# Patient Record
Sex: FEMALE | Race: WHITE | URBAN - METROPOLITAN AREA
[De-identification: names, ages, dates, MRNs, and addresses within clinical notes are randomized per-mention and may not be internally consistent; named-entity substitution may affect disease eponyms.]

---

## 2020-10-06 NOTE — PROGRESS NOTES
HISTORY OF PRESENT ILLNESS  Valeria Collazo is a 61 y.o. female. HPI     Pt is here to establish care.   This is an established visit completed with telemedicine was completed with video assist  the patient acknowledges and agrees to this method of visitation chip    She just recently moved from [place], [place]    She has htn  BP at home 123/79 126/73 128/76 122/75 126/76 131/72 120/75 118/72   HR 70s  She is on lisinopril 40 mg, 0.5 tabs of chlorthalidone 25 mg, and toprol xl 25 mg     Wt is 186 lb per pt - stable   Her and her  go on walks often  She has started to watch portions more   Discussed not drinking calories, [employer], and counting calories  Ht is 5'8\"    Ordered labs     She notes elevated cholesterol last year  She is just working on diet and wt loss    She takes fish oil and vit d3 2000U    PMHx: htn    FMHx:dad- mild kidney disease, prostate cancer grandmother - breast cancer [de-identified])    PSHx: L ankle arthroscopy, L knee arthroscopy , bilateral cheilectomy    SHx: 4-5 drinks alcohol per week, she used to smoke 30 years ago, , 2 children, retired    PREVENTIVE:  Colonoscopy: 11/16 [address] [facility]: unofficial one years ago, not yet needed   Mammo: 4/19 negative, scheduled Dr. Beth Arias 11/24/20   Pap: scheduled [facility] Dr. Beth Arias 11/24/20 - needs referral   Tdap: ~5-8 years ago, will update in clinic   Pneumovax: not yet needed  Shingrix: will be getting 10/13/20  Flu shot: 9/20  Eye exam: 3/19 [place]  Lipids: 2019 223 per pt, ordered       Patient Active Problem List    Diagnosis Date Noted    Essential hypertension 10/09/2020    Mixed hyperlipidemia 10/09/2020    Vitamin D deficiency 10/09/2020       Past Surgical History:   Procedure Laterality Date    HX KNEE ARTHROSCOPY Left 10/2011    HX OTHER SURGICAL Left 1986    Left ankle Arthroscopy    HX OTHER SURGICAL Bilateral [id number]    Biliteral Cheilectomy on big toes      Lab Results   Component Value Date/Time    WBC 7.0 12/04/2009 11:01 AM HGB 14.6 12/04/2009 11:01 AM    HCT 42.2 12/04/2009 11:01 AM    PLATELET 714 69/88/4528 11:01 AM    MCV 91.3 12/04/2009 11:01 AM     Lab Results   Component Value Date/Time    Cholesterol, total 211 (H) 11/03/2009 10:01 AM    HDL Cholesterol 43 11/03/2009 10:01 AM    LDL, calculated 132.6 (H) 11/03/2009 10:01 AM    Triglyceride 177 (H) 11/03/2009 10:01 AM    CHOL/HDL Ratio 4.9 11/03/2009 10:01 AM     Lab Results   Component Value Date/Time    GFR est non-AA >60 11/03/2009 10:01 AM    GFR est AA >60 11/03/2009 10:01 AM    Creatinine 1.0 11/03/2009 10:01 AM    BUN 16 11/03/2009 10:01 AM    Sodium 137 11/03/2009 10:01 AM    Potassium 4.1 11/03/2009 10:01 AM    Chloride 102 11/03/2009 10:01 AM    CO2 29 11/03/2009 10:01 AM        Review of Systems   Constitutional: Negative for chills and fever. HENT: Negative for hearing loss and tinnitus. Eyes: Positive for blurred vision (floaters). Negative for double vision. Respiratory: Negative for shortness of breath and wheezing. Cardiovascular: Negative for chest pain and palpitations. Gastrointestinal: Negative for nausea and vomiting. Genitourinary: Negative for dysuria and frequency. Musculoskeletal: Negative for back pain, falls and joint pain. Skin: Negative for itching and rash. Neurological: Negative for dizziness, loss of consciousness and headaches. Psychiatric/Behavioral: Negative for depression. The patient is not nervous/anxious. Physical Exam  Constitutional:       General: She is not in acute distress. Appearance: Normal appearance. She is not ill-appearing, toxic-appearing or diaphoretic. HENT:      Head: Normocephalic and atraumatic. Eyes:      General:         Right eye: No discharge. Left eye: No discharge. Conjunctiva/sclera: Conjunctivae normal.   Pulmonary:      Effort: No respiratory distress. Neurological:      Mental Status: She is alert and oriented to person, place, and time.  Mental status is at baseline. Gait: Gait normal.   Psychiatric:         Mood and Affect: Mood normal.         Behavior: Behavior normal.         ASSESSMENT and PLAN    ICD-10-CM ICD-9-CM    1. Essential hypertension           Lisinopril 40 mg    Toprol-XL    And chlorthalidone 25 mg half tablet per day Home readings great no change to dose check labs   I10 401.9 LIPID PANEL      METABOLIC PANEL, COMPREHENSIVE      CBC W/O DIFF      HEMOGLOBIN A1C WITH EAG      TSH 3RD GENERATION      VITAMIN D, 25 HYDROXY      HEPATITIS C AB   2. Mixed hyperlipidemia  E78.2 272.2 LIPID PANEL   Diet controlled check lipids treat as needed   METABOLIC PANEL, COMPREHENSIVE      CBC W/O DIFF      HEMOGLOBIN A1C WITH EAG      TSH 3RD GENERATION      VITAMIN D, 25 HYDROXY      HEPATITIS C AB   3. Physical exam, annual  Z00.00 V70.0 REFERRAL TO GYNECOLOGY      LIPID PANEL   Colonoscopy up-to-date will get report mammogram pelvic exam are scheduled bone density not yet needed eye exam is due she will schedule labs are ordered    Flu shot is completed    We will update Tdap at her next office visit    Working on weight loss   METABOLIC PANEL, COMPREHENSIVE      CBC W/O DIFF      HEMOGLOBIN A1C WITH EAG      TSH 3RD GENERATION      VITAMIN D, 25 HYDROXY      HEPATITIS C AB   4. Encounter for hepatitis C screening test for low risk patient  Z11.59 V73.89 LIPID PANEL      METABOLIC PANEL, COMPREHENSIVE   Screen   CBC W/O DIFF      HEMOGLOBIN A1C WITH EAG      TSH 3RD GENERATION      VITAMIN D, 25 HYDROXY      HEPATITIS C AB   5. Vitamin D deficiency  E55.9 268.9 LIPID PANEL      METABOLIC PANEL, COMPREHENSIVE   Takes OTC vitamin D check level treat as needed   CBC W/O DIFF      HEMOGLOBIN A1C WITH EAG      TSH 3RD GENERATION      VITAMIN D, 25 HYDROXY      HEPATITIS C AB         Scribed by Rosanne Barbosa of [address] [place] [address] [address], as dictated by Dr. Agus Jimenes. Current diagnosis and concerns discussed with pt at length.  Pt understands risks and benefits or current treatment plan and medications, and accepts the treatment and medication with any possible risks. Pt asks appropriate questions, which were answered. Pt was instructed to call with any concerns or problems. I have reviewed the note documented by the scribe. The services provided are my own. The documentation is accurate     Gail Worthy, who was evaluated through a synchronous (real-time) audio-video encounter, and/or her healthcare decision maker, is aware that it is a billable service, with coverage as determined by her insurance carrier. She provided verbal consent to proceed: Yes, and patient identification was verified. It was conducted pursuant to the emergency declaration under the [facility], [address] [place] [address] authority and the [employer] and [employer] Act. A caregiver was present when appropriate. Ability to conduct physical exam was limited. I was at home. The patient was at home.

## 2020-10-09 ENCOUNTER — VIRTUAL VISIT (OUTPATIENT)
Dept: INTERNAL MEDICINE CLINIC | Age: 60
End: 2020-10-09
Payer: OTHER GOVERNMENT

## 2020-10-09 DIAGNOSIS — Z00.00 PHYSICAL EXAM, ANNUAL: ICD-10-CM

## 2020-10-09 DIAGNOSIS — E55.9 VITAMIN D DEFICIENCY: ICD-10-CM

## 2020-10-09 DIAGNOSIS — I10 ESSENTIAL HYPERTENSION: Primary | ICD-10-CM

## 2020-10-09 DIAGNOSIS — E78.2 MIXED HYPERLIPIDEMIA: ICD-10-CM

## 2020-10-09 DIAGNOSIS — Z11.59 ENCOUNTER FOR HEPATITIS C SCREENING TEST FOR LOW RISK PATIENT: ICD-10-CM

## 2020-10-09 PROCEDURE — 99203 OFFICE O/P NEW LOW 30 MIN: CPT | Performed by: INTERNAL MEDICINE

## 2020-10-09 RX ORDER — METOPROLOL SUCCINATE 25 MG/1
25 TABLET, EXTENDED RELEASE ORAL DAILY
COMMUNITY
End: 2020-10-09 | Stop reason: SDUPTHER

## 2020-10-09 RX ORDER — GLUCOSAM/CHONDRO/HERB 149/HYAL 750-100 MG
1 TABLET ORAL DAILY
COMMUNITY

## 2020-10-09 RX ORDER — CHLORTHALIDONE 25 MG/1
25 TABLET ORAL DAILY
COMMUNITY
End: 2020-10-09 | Stop reason: SDUPTHER

## 2020-10-09 RX ORDER — LISINOPRIL 40 MG/1
40 TABLET ORAL DAILY
Qty: 90 TAB | Refills: 1 | Status: SHIPPED | OUTPATIENT
Start: 2020-10-09 | End: 2021-04-20 | Stop reason: SDUPTHER

## 2020-10-09 RX ORDER — METOPROLOL SUCCINATE 25 MG/1
25 TABLET, EXTENDED RELEASE ORAL DAILY
Qty: 90 TAB | Refills: 1 | Status: SHIPPED | OUTPATIENT
Start: 2020-10-09 | End: 2021-04-20 | Stop reason: SDUPTHER

## 2020-10-09 RX ORDER — LISINOPRIL 40 MG/1
40 TABLET ORAL DAILY
COMMUNITY
End: 2020-10-09 | Stop reason: SDUPTHER

## 2020-10-09 RX ORDER — ACETAMINOPHEN 500 MG
2000 TABLET ORAL DAILY
COMMUNITY

## 2020-10-09 RX ORDER — CHLORTHALIDONE 25 MG/1
12.5 TABLET ORAL DAILY
Qty: 45 TAB | Refills: 1 | Status: SHIPPED | OUTPATIENT
Start: 2020-10-09 | End: 2021-04-20 | Stop reason: SDUPTHER

## 2020-10-09 NOTE — TELEPHONE ENCOUNTER
Future Appointments:  Future Appointments   Date Time Provider Terri Reis   4/6/2021 11:00 AM Lyndsey Montoya MD [facility] BS AMB        Last Appointment With Me:  10/9/2020     Requested Prescriptions     Pending Prescriptions Disp Refills    chlorthalidone (HYGROTON) 25 mg tablet 45 Tab 1     Sig: Take 0.5 Tabs by mouth daily.  lisinopriL (PRINIVIL, ZESTRIL) 40 mg tablet 90 Tab 1     Sig: Take 1 Tab by mouth daily.  metoprolol succinate (Toprol XL) 25 mg XL tablet 90 Tab 1     Sig: Take 1 Tab by mouth daily.

## 2020-11-06 LAB
25(OH)D3+25(OH)D2 SERPL-MCNC: 43.4 NG/ML (ref 30–100)
ALBUMIN SERPL-MCNC: 4.4 G/DL (ref 3.8–4.9)
ALBUMIN/GLOB SERPL: 1.4 {RATIO} (ref 1.2–2.2)
ALP SERPL-CCNC: 105 IU/L (ref 39–117)
ALT SERPL-CCNC: 15 IU/L (ref 0–32)
AST SERPL-CCNC: 17 IU/L (ref 0–40)
BILIRUB SERPL-MCNC: 0.5 MG/DL (ref 0–1.2)
BUN SERPL-MCNC: 16 MG/DL (ref 8–27)
BUN/CREAT SERPL: 17 (ref 12–28)
CALCIUM SERPL-MCNC: 9.9 MG/DL (ref 8.7–10.3)
CHLORIDE SERPL-SCNC: 100 MMOL/L (ref 96–106)
CHOLEST SERPL-MCNC: 202 MG/DL (ref 100–199)
CO2 SERPL-SCNC: 26 MMOL/L (ref 20–29)
CREAT SERPL-MCNC: 0.96 MG/DL (ref 0.57–1)
ERYTHROCYTE [DISTWIDTH] IN BLOOD BY AUTOMATED COUNT: 13.2 % (ref 11.7–15.4)
EST. AVERAGE GLUCOSE BLD GHB EST-MCNC: 108 MG/DL
GLOBULIN SER CALC-MCNC: 3.2 G/DL (ref 1.5–4.5)
GLUCOSE SERPL-MCNC: 109 MG/DL (ref 65–99)
HBA1C MFR BLD: 5.4 % (ref 4.8–5.6)
HCT VFR BLD AUTO: 42.8 % (ref 34–46.6)
HCV AB S/CO SERPL IA: <0.1 S/CO RATIO (ref 0–0.9)
HDLC SERPL-MCNC: 44 MG/DL
HGB BLD-MCNC: 14.3 G/DL (ref 11.1–15.9)
LDLC SERPL CALC-MCNC: 129 MG/DL (ref 0–99)
MCH RBC QN AUTO: 30.1 PG (ref 26.6–33)
MCHC RBC AUTO-ENTMCNC: 33.4 G/DL (ref 31.5–35.7)
MCV RBC AUTO: 90 FL (ref 79–97)
PLATELET # BLD AUTO: 283 X10E3/UL (ref 150–450)
POTASSIUM SERPL-SCNC: 4.1 MMOL/L (ref 3.5–5.2)
PROT SERPL-MCNC: 7.6 G/DL (ref 6–8.5)
RBC # BLD AUTO: 4.75 X10E6/UL (ref 3.77–5.28)
SODIUM SERPL-SCNC: 136 MMOL/L (ref 134–144)
TRIGL SERPL-MCNC: 162 MG/DL (ref 0–149)
TSH SERPL DL<=0.005 MIU/L-ACNC: 1.72 UIU/ML (ref 0.45–4.5)
VLDLC SERPL CALC-MCNC: 29 MG/DL (ref 5–40)
WBC # BLD AUTO: 4.8 X10E3/UL (ref 3.4–10.8)

## 2021-03-18 ENCOUNTER — IMMUNIZATION (OUTPATIENT)
Dept: INTERNAL MEDICINE CLINIC | Age: 61
End: 2021-03-18
Payer: OTHER GOVERNMENT

## 2021-03-18 DIAGNOSIS — Z23 ENCOUNTER FOR IMMUNIZATION: Primary | ICD-10-CM

## 2021-03-18 PROCEDURE — 91300 COVID-19, MRNA, LNP-S, PF, 30MCG/0.3ML DOSE(PFIZER): CPT | Performed by: FAMILY MEDICINE

## 2021-03-18 PROCEDURE — 0001A COVID-19, MRNA, LNP-S, PF, 30MCG/0.3ML DOSE(PFIZER): CPT | Performed by: FAMILY MEDICINE

## 2021-04-08 ENCOUNTER — IMMUNIZATION (OUTPATIENT)
Dept: INTERNAL MEDICINE CLINIC | Age: 61
End: 2021-04-08
Payer: OTHER GOVERNMENT

## 2021-04-08 DIAGNOSIS — Z23 ENCOUNTER FOR IMMUNIZATION: Primary | ICD-10-CM

## 2021-04-08 PROCEDURE — 91300 COVID-19, MRNA, LNP-S, PF, 30MCG/0.3ML DOSE(PFIZER): CPT | Performed by: FAMILY MEDICINE

## 2021-04-08 PROCEDURE — 0002A COVID-19, MRNA, LNP-S, PF, 30MCG/0.3ML DOSE(PFIZER): CPT | Performed by: FAMILY MEDICINE

## 2021-04-19 NOTE — PROGRESS NOTES
HISTORY OF PRESENT ILLNESS  Pillo Dobbins is a 61 y.o. female. HPI     Last here 10/9/20. Here for routine care.       BP today is 137/83   BP at home 126/72 145/88   She is on lisinopril 40 mg, 0.5 tabs of chlorthalidone 25 mg, and toprol xl 25 mg      Wt is 193 lbs - up 7 lbs x lov    Discussed not drinking calories, [employer], and counting calories    Reviewed labs   Ordered labs      She takes fish oil and vit d3 2000U    Reports ekg in last 5 years  Her prev doctor thought she had a heart attack & SSS  She had cardiac workup 6-7 years ago, stress echo, holter monitor - all nl per pt   Discussed bringing in these records  Will get EKG today        PREVENTIVE:  Colonoscopy:  Dr. Arcadio Shirley, [place] # [phone]   Dexa: unofficial one years ago, not yet needed   Mammo: Dr. Candida Askew 20   Pap: [address] Dr. Candida Askew 20   Tdap: 7/10/14  Pneumovax: not yet needed  Shingrix: completed both   Flu shot:   Eye exam: 3/19 [place]  Lipids:  129  A1c:  5.4  Hep C:  negative  Covid: both complete (pfizer)  EK21    Patient Active Problem List    Diagnosis Date Noted    Essential hypertension 10/09/2020    Mixed hyperlipidemia 10/09/2020    Vitamin D deficiency 10/09/2020     Current Outpatient Medications   Medication Sig Dispense Refill    cholecalciferol (VITAMIN D3) (2,000 UNITS /50 MCG) cap capsule Take 2,000 Units by mouth daily.  omega 3-DHA-EPA-fish oil (Fish Oil) 1,000 mg (120 mg-180 mg) capsule Take 1 Cap by mouth daily.  chlorthalidone (HYGROTON) 25 mg tablet Take 0.5 Tabs by mouth daily. 45 Tab 1    lisinopriL (PRINIVIL, ZESTRIL) 40 mg tablet Take 1 Tab by mouth daily. 90 Tab 1    metoprolol succinate (Toprol XL) 25 mg XL tablet Take 1 Tab by mouth daily. 90 Tab 1    docusate sodium (COLACE) 100 mg capsule Take 100 mg by mouth daily.        Past Surgical History:   Procedure Laterality Date    HX KNEE ARTHROSCOPY Left 10/2011    HX OTHER SURGICAL Left 1986    Left ankle Arthroscopy    HX OTHER SURGICAL Bilateral [id number]    Biliteral Cheilectomy on big toes      Lab Results   Component Value Date/Time    WBC 4.8 11/05/2020 09:51 AM    HGB 14.3 11/05/2020 09:51 AM    HCT 42.8 11/05/2020 09:51 AM    PLATELET 773 53/88/1172 09:51 AM    MCV 90 11/05/2020 09:51 AM     Lab Results   Component Value Date/Time    Cholesterol, total 202 (H) 11/05/2020 09:51 AM    HDL Cholesterol 44 11/05/2020 09:51 AM    LDL, calculated 129 (H) 11/05/2020 09:51 AM    LDL, calculated 132.6 (H) 11/03/2009 10:01 AM    Triglyceride 162 (H) 11/05/2020 09:51 AM    CHOL/HDL Ratio 4.9 11/03/2009 10:01 AM     Lab Results   Component Value Date/Time    GFR est non-AA 64 11/05/2020 09:51 AM    GFR est AA 74 11/05/2020 09:51 AM    Creatinine 0.96 11/05/2020 09:51 AM    BUN 16 11/05/2020 09:51 AM    Sodium 136 11/05/2020 09:51 AM    Potassium 4.1 11/05/2020 09:51 AM    Chloride 100 11/05/2020 09:51 AM    CO2 26 11/05/2020 09:51 AM        Review of Systems   Respiratory: Negative for shortness of breath. Cardiovascular: Negative for chest pain. Physical Exam  Constitutional:       General: She is not in acute distress. Appearance: Normal appearance. She is not ill-appearing, toxic-appearing or diaphoretic. HENT:      Head: Normocephalic and atraumatic. Right Ear: External ear normal.      Left Ear: External ear normal.   Eyes:      General:         Right eye: No discharge. Left eye: No discharge. Conjunctiva/sclera: Conjunctivae normal.      Pupils: Pupils are equal, round, and reactive to light. Neck:      Musculoskeletal: Normal range of motion and neck supple. Vascular: No carotid bruit. Cardiovascular:      Rate and Rhythm: Normal rate and regular rhythm. Pulses: Normal pulses. Heart sounds: Normal heart sounds. No murmur. No friction rub. No gallop. Pulmonary:      Effort: No respiratory distress.       Breath sounds: Normal breath sounds. No wheezing or rales. Chest:      Chest wall: No tenderness. Abdominal:      General: Abdomen is flat. There is no distension. Palpations: Abdomen is soft. There is no mass. Tenderness: There is no abdominal tenderness. There is no guarding or rebound. Hernia: No hernia is present. Musculoskeletal: Normal range of motion. Right lower leg: No edema. Left lower leg: No edema. Skin:     General: Skin is warm and dry. Neurological:      Mental Status: She is alert and oriented to person, place, and time. Mental status is at baseline. Coordination: Coordination normal.      Gait: Gait normal.   Psychiatric:         Mood and Affect: Mood normal.         Behavior: Behavior normal.         ASSESSMENT and PLAN    ICD-10-CM ICD-9-CM    1. Physical exam, annual       EKG likely unchanged from prior patient describes previous changes that are consistent what I see today no acute ischemia    Had past cardiac work-up with stress test and echo she will bring in those reports for review    Lab ordered    We will get colonoscopy report    And [facility]    Mammo and pelvic exam are up-to-date vaccines are up-to-date     Z00.00 V70.0    2. Mixed hyperlipidemia     Diet controlled E78.2 272.2    3. Essential hypertension       Controlled lisinopril chlorthalidone Toprol no change in dose Q93 276.5 METABOLIC PANEL, BASIC      Depression screen reviewed and negative      Scribed by Perez Dominguez, as dictated by Dr. Alyssa Orr. Current diagnosis and concerns discussed with pt at length. Pt understands risks and benefits or current treatment plan and medications, and accepts the treatment and medication with any possible risks. Pt asks appropriate questions, which were answered. Pt was instructed to call with any concerns or problems. I have reviewed the note documented by the scribe. The services provided are my own.   The documentation is accurate

## 2021-04-20 ENCOUNTER — OFFICE VISIT (OUTPATIENT)
Dept: INTERNAL MEDICINE CLINIC | Age: 61
End: 2021-04-20
Payer: OTHER GOVERNMENT

## 2021-04-20 VITALS
HEART RATE: 90 BPM | TEMPERATURE: 97.6 F | DIASTOLIC BLOOD PRESSURE: 83 MMHG | WEIGHT: 193.4 LBS | SYSTOLIC BLOOD PRESSURE: 137 MMHG | RESPIRATION RATE: 16 BRPM | OXYGEN SATURATION: 98 % | HEIGHT: 68 IN | BODY MASS INDEX: 29.31 KG/M2

## 2021-04-20 DIAGNOSIS — E78.2 MIXED HYPERLIPIDEMIA: ICD-10-CM

## 2021-04-20 DIAGNOSIS — Z00.00 PHYSICAL EXAM, ANNUAL: Primary | ICD-10-CM

## 2021-04-20 DIAGNOSIS — I10 ESSENTIAL HYPERTENSION: ICD-10-CM

## 2021-04-20 PROCEDURE — 99396 PREV VISIT EST AGE 40-64: CPT | Performed by: INTERNAL MEDICINE

## 2021-04-20 PROCEDURE — 93000 ELECTROCARDIOGRAM COMPLETE: CPT | Performed by: INTERNAL MEDICINE

## 2021-04-20 RX ORDER — DOCUSATE SODIUM 100 MG/1
100 CAPSULE, LIQUID FILLED ORAL DAILY
COMMUNITY

## 2021-04-20 RX ORDER — LISINOPRIL 40 MG/1
40 TABLET ORAL DAILY
Qty: 90 TAB | Refills: 1 | Status: SHIPPED | OUTPATIENT
Start: 2021-04-20 | End: 2021-10-19 | Stop reason: SDUPTHER

## 2021-04-20 RX ORDER — CHLORTHALIDONE 25 MG/1
12.5 TABLET ORAL DAILY
Qty: 45 TAB | Refills: 1 | Status: SHIPPED | OUTPATIENT
Start: 2021-04-20 | End: 2021-10-19 | Stop reason: SDUPTHER

## 2021-04-20 RX ORDER — METOPROLOL SUCCINATE 25 MG/1
25 TABLET, EXTENDED RELEASE ORAL DAILY
Qty: 90 TAB | Refills: 1 | Status: SHIPPED | OUTPATIENT
Start: 2021-04-20 | End: 2021-10-19 | Stop reason: SDUPTHER

## 2021-04-20 NOTE — PROGRESS NOTES
No acute ischemia no comparison but EKG was completed previously in [place] patient describes similar abnormalities she will be bringing a copy of her old records

## 2021-10-18 NOTE — PROGRESS NOTES
HISTORY OF PRESENT ILLNESS  David Winters is a 64 y.o. female. HPI     Last here 21. Here for routine care.       Discussed that the patient is eligible for the covid vaccine booster 6 months after their last dose  She wonders about mixing and matching covid vaccines,     BP today is 133/79  BP at home 128/75 124/76 123/78 132/73 125/76 134/83 121/68 126/73 123/69  HR usually around 80 sometimes 70s   She is on lisinopril 40 mg, 0.5 tabs of chlorthalidone 25 mg, and toprol xl 25 mg      Wt today is 184 lbs, down 9 lbs x lov   Discussed diet and wt/l  She has been trying to make better choices with her diet     Reviewed labs   Will get labs today     She takes fish oil and vit d3 2000U     Brought in ekg report  Her prev doctor thought she had a heart attack & SSS  She had cardiac workup 6-7 years ago, stress echo, holter monitor - all nl per pt   12/14/15 nl stress echo       PREVENTIVE:  Colonoscopy:  Dr. Jennifer Funes, [place] # [phone]   Dexa: unofficial one years ago, not yet needed   Mammo: Dr. Viky Jaramillo 20- nl, scheduled   Pap: [address]. Viky Jaramillo 20, scheduled   Tdap: 7/10/14  Pneumovax: not yet needed  Shingrix: completed both   Flu shot: 21  Eye exam: 3/19 [place]  Lipids:  129  A1c:  5.4  Hep C:  negative  Covid: both complete (Maximus Pacheco)  EK21    Patient Active Problem List    Diagnosis Date Noted    Essential hypertension 10/09/2020    Mixed hyperlipidemia 10/09/2020    Vitamin D deficiency 10/09/2020     Current Outpatient Medications   Medication Sig Dispense Refill    lisinopriL (PRINIVIL, ZESTRIL) 40 mg tablet Take 1 Tablet by mouth daily. 90 Tablet 1    chlorthalidone (HYGROTON) 25 mg tablet Take 0.5 Tablets by mouth daily. 45 Tablet 1    metoprolol succinate (Toprol XL) 25 mg XL tablet Take 1 Tablet by mouth daily. 90 Tablet 1    docusate sodium (COLACE) 100 mg capsule Take 100 mg by mouth daily.       cholecalciferol (VITAMIN D3) (2,000 UNITS /50 MCG) cap capsule Take 2,000 Units by mouth daily.  omega 3-DHA-EPA-fish oil (Fish Oil) 1,000 mg (120 mg-180 mg) capsule Take 1 Cap by mouth daily. Past Surgical History:   Procedure Laterality Date    HX KNEE ARTHROSCOPY Left 10/2011    HX OTHER SURGICAL Left 1986    Left ankle Arthroscopy    HX OTHER SURGICAL Bilateral [id number]    Biliteral Cheilectomy on big toes      Lab Results   Component Value Date/Time    WBC 4.8 11/05/2020 09:51 AM    HGB 14.3 11/05/2020 09:51 AM    HCT 42.8 11/05/2020 09:51 AM    PLATELET 926 13/62/6224 09:51 AM    MCV 90 11/05/2020 09:51 AM     Lab Results   Component Value Date/Time    Cholesterol, total 202 (H) 11/05/2020 09:51 AM    HDL Cholesterol 44 11/05/2020 09:51 AM    LDL, calculated 129 (H) 11/05/2020 09:51 AM    LDL, calculated 132.6 (H) 11/03/2009 10:01 AM    Triglyceride 162 (H) 11/05/2020 09:51 AM    CHOL/HDL Ratio 4.9 11/03/2009 10:01 AM     Lab Results   Component Value Date/Time    GFR est non-AA >60 04/21/2021 09:51 AM    GFR est AA >60 04/21/2021 09:51 AM    Creatinine 0.87 04/21/2021 09:51 AM    BUN 18 04/21/2021 09:51 AM    Sodium 136 04/21/2021 09:51 AM    Potassium 3.8 04/21/2021 09:51 AM    Chloride 103 04/21/2021 09:51 AM    CO2 26 04/21/2021 09:51 AM        Review of Systems   Respiratory: Negative for shortness of breath. Cardiovascular: Negative for chest pain. Physical Exam  Constitutional:       General: She is not in acute distress. Appearance: Normal appearance. She is not ill-appearing, toxic-appearing or diaphoretic. HENT:      Head: Normocephalic and atraumatic. Right Ear: External ear normal.      Left Ear: External ear normal.   Eyes:      General:         Right eye: No discharge. Left eye: No discharge. Conjunctiva/sclera: Conjunctivae normal.      Pupils: Pupils are equal, round, and reactive to light. Cardiovascular:      Rate and Rhythm: Normal rate and regular rhythm. Heart sounds: No murmur heard. No friction rub. No gallop. Pulmonary:      Effort: No respiratory distress. Breath sounds: Normal breath sounds. No wheezing or rales. Chest:      Chest wall: No tenderness. Musculoskeletal:         General: Normal range of motion. Cervical back: Normal range of motion and neck supple. Skin:     General: Skin is warm and dry. Neurological:      Mental Status: She is alert and oriented to person, place, and time. Mental status is at baseline. Coordination: Coordination normal.      Gait: Gait normal.   Psychiatric:         Mood and Affect: Mood normal.         Behavior: Behavior normal.         ASSESSMENT and PLAN    ICD-10-CM ICD-9-CM    1. Essential hypertension    I10 401.9 LIPID PANEL      METABOLIC PANEL, COMPREHENSIVE      CBC W/O DIFF      TSH 3RD GENERATION      HEMOGLOBIN A1C WITH EAG      REFERRAL TO GYNECOLOGY   2. Mixed hyperlipidemia  E78.2 272.2 LIPID PANEL      METABOLIC PANEL, COMPREHENSIVE      CBC W/O DIFF   Controlled lisinopril chlorthalidone Toprol continue   TSH 3RD GENERATION      HEMOGLOBIN A1C WITH EAG      REFERRAL TO GYNECOLOGY   3. Vitamin D deficiency  E55.9 268.9 LIPID PANEL      METABOLIC PANEL, COMPREHENSIVE      CBC W/O DIFF   Continue over-the-counter vitamin D 2000 units/day   TSH 3RD GENERATION      HEMOGLOBIN A1C WITH EAG      REFERRAL TO GYNECOLOGY   4. Post-menopause  Z78.0 V49.81 LIPID PANEL      METABOLIC PANEL, COMPREHENSIVE   Schedule pelvic exam   CBC W/O DIFF      TSH 3RD GENERATION      HEMOGLOBIN A1C WITH EAG      REFERRAL TO GYNECOLOGY   5. IFG (impaired fasting glucose)  R73.01 790.21 LIPID PANEL      METABOLIC PANEL, COMPREHENSIVE      CBC W/O DIFF   Check A1c   TSH 3RD GENERATION      HEMOGLOBIN A1C WITH EAG      REFERRAL TO GYNECOLOGY        Depression screen reviewed and negative     Scribed by Elvia Barton, as dictated by Dr. Fam Chau.      Current diagnosis and concerns discussed with pt at length. Pt understands risks and benefits or current treatment plan and medications, and accepts the treatment and medication with any possible risks. Pt asks appropriate questions, which were answered. Pt was instructed to call with any concerns or problems. I have reviewed the note documented by the scribe. The services provided are my own.   The documentation is accurate

## 2021-10-19 ENCOUNTER — OFFICE VISIT (OUTPATIENT)
Dept: INTERNAL MEDICINE CLINIC | Age: 61
End: 2021-10-19
Payer: OTHER GOVERNMENT

## 2021-10-19 VITALS
WEIGHT: 184 LBS | HEIGHT: 68 IN | TEMPERATURE: 97.9 F | HEART RATE: 80 BPM | SYSTOLIC BLOOD PRESSURE: 133 MMHG | RESPIRATION RATE: 16 BRPM | DIASTOLIC BLOOD PRESSURE: 79 MMHG | OXYGEN SATURATION: 99 % | BODY MASS INDEX: 27.89 KG/M2

## 2021-10-19 DIAGNOSIS — E55.9 VITAMIN D DEFICIENCY: ICD-10-CM

## 2021-10-19 DIAGNOSIS — E78.2 MIXED HYPERLIPIDEMIA: ICD-10-CM

## 2021-10-19 DIAGNOSIS — I10 ESSENTIAL HYPERTENSION: Primary | ICD-10-CM

## 2021-10-19 DIAGNOSIS — R73.01 IFG (IMPAIRED FASTING GLUCOSE): ICD-10-CM

## 2021-10-19 DIAGNOSIS — Z78.0 POST-MENOPAUSE: ICD-10-CM

## 2021-10-19 LAB
ALBUMIN SERPL-MCNC: 3.8 G/DL (ref 3.5–5)
ALBUMIN/GLOB SERPL: 0.9 {RATIO} (ref 1.1–2.2)
ALP SERPL-CCNC: 104 U/L (ref 45–117)
ALT SERPL-CCNC: 20 U/L (ref 12–78)
ANION GAP SERPL CALC-SCNC: 4 MMOL/L (ref 5–15)
AST SERPL-CCNC: 15 U/L (ref 15–37)
BILIRUB SERPL-MCNC: 0.5 MG/DL (ref 0.2–1)
BUN SERPL-MCNC: 11 MG/DL (ref 6–20)
BUN/CREAT SERPL: 11 (ref 12–20)
CALCIUM SERPL-MCNC: 9.5 MG/DL (ref 8.5–10.1)
CHLORIDE SERPL-SCNC: 102 MMOL/L (ref 97–108)
CHOLEST SERPL-MCNC: 189 MG/DL
CO2 SERPL-SCNC: 28 MMOL/L (ref 21–32)
CREAT SERPL-MCNC: 0.96 MG/DL (ref 0.55–1.02)
ERYTHROCYTE [DISTWIDTH] IN BLOOD BY AUTOMATED COUNT: 12.3 % (ref 11.5–14.5)
EST. AVERAGE GLUCOSE BLD GHB EST-MCNC: 108 MG/DL
GLOBULIN SER CALC-MCNC: 4.2 G/DL (ref 2–4)
GLUCOSE SERPL-MCNC: 100 MG/DL (ref 65–100)
HBA1C MFR BLD: 5.4 % (ref 4–5.6)
HCT VFR BLD AUTO: 40.7 % (ref 35–47)
HDLC SERPL-MCNC: 37 MG/DL
HDLC SERPL: 5.1 {RATIO} (ref 0–5)
HGB BLD-MCNC: 13.6 G/DL (ref 11.5–16)
LDLC SERPL CALC-MCNC: 122.2 MG/DL (ref 0–100)
MCH RBC QN AUTO: 30.5 PG (ref 26–34)
MCHC RBC AUTO-ENTMCNC: 33.4 G/DL (ref 30–36.5)
MCV RBC AUTO: 91.3 FL (ref 80–99)
NRBC # BLD: 0 K/UL (ref 0–0.01)
NRBC BLD-RTO: 0 PER 100 WBC
PLATELET # BLD AUTO: 298 K/UL (ref 150–400)
PMV BLD AUTO: 10.1 FL (ref 8.9–12.9)
POTASSIUM SERPL-SCNC: 4.1 MMOL/L (ref 3.5–5.1)
PROT SERPL-MCNC: 8 G/DL (ref 6.4–8.2)
RBC # BLD AUTO: 4.46 M/UL (ref 3.8–5.2)
SODIUM SERPL-SCNC: 134 MMOL/L (ref 136–145)
TRIGL SERPL-MCNC: 149 MG/DL (ref ?–150)
TSH SERPL DL<=0.05 MIU/L-ACNC: 1.37 UIU/ML (ref 0.36–3.74)
VLDLC SERPL CALC-MCNC: 29.8 MG/DL
WBC # BLD AUTO: 5.2 K/UL (ref 3.6–11)

## 2021-10-19 PROCEDURE — 99213 OFFICE O/P EST LOW 20 MIN: CPT | Performed by: INTERNAL MEDICINE

## 2021-10-19 RX ORDER — LISINOPRIL 40 MG/1
40 TABLET ORAL DAILY
Qty: 90 TABLET | Refills: 1 | Status: SHIPPED | OUTPATIENT
Start: 2021-10-19 | End: 2022-05-25 | Stop reason: SDUPTHER

## 2021-10-19 RX ORDER — METOPROLOL SUCCINATE 25 MG/1
25 TABLET, EXTENDED RELEASE ORAL DAILY
Qty: 90 TABLET | Refills: 1 | Status: SHIPPED | OUTPATIENT
Start: 2021-10-19 | End: 2021-11-23 | Stop reason: SDUPTHER

## 2021-10-19 RX ORDER — CHLORTHALIDONE 25 MG/1
12.5 TABLET ORAL DAILY
Qty: 45 TABLET | Refills: 1 | Status: SHIPPED | OUTPATIENT
Start: 2021-10-19 | End: 2022-02-23

## 2021-10-21 DIAGNOSIS — E87.1 LOW SODIUM LEVELS: Primary | ICD-10-CM

## 2021-10-21 NOTE — PROGRESS NOTES
Please call patient back about results  Mild low sodium on labs.   Make sure she is only taking half chlorthalidone daily   Was normal before  For now no change to meds  Lets just repeat bmp in 3-4 weeks to ensure stable  Rest fine, minimal ldl up --diet

## 2021-10-21 NOTE — PROGRESS NOTES
Called, spoke with Pt. Two pt identifiers confirmed. Pt informed of lab results and recommendations per Dr. Marlen Jones. Pt confirms she is taking only 1/2 of the chlorthalidone. Pt informed of lab that needs to be repeated in 3-4 weeks. -mailed lab slip to patient. -  Pt verbalized understanding of information discussed w/ no further questions at this time.

## 2021-11-18 ENCOUNTER — LAB ONLY (OUTPATIENT)
Dept: INTERNAL MEDICINE CLINIC | Age: 61
End: 2021-11-18

## 2021-11-18 DIAGNOSIS — E87.1 LOW SODIUM LEVELS: ICD-10-CM

## 2021-11-18 LAB
ANION GAP SERPL CALC-SCNC: 7 MMOL/L (ref 5–15)
BUN SERPL-MCNC: 12 MG/DL (ref 6–20)
BUN/CREAT SERPL: 13 (ref 12–20)
CALCIUM SERPL-MCNC: 9.5 MG/DL (ref 8.5–10.1)
CHLORIDE SERPL-SCNC: 100 MMOL/L (ref 97–108)
CO2 SERPL-SCNC: 26 MMOL/L (ref 21–32)
CREAT SERPL-MCNC: 0.9 MG/DL (ref 0.55–1.02)
GLUCOSE SERPL-MCNC: 106 MG/DL (ref 65–100)
POTASSIUM SERPL-SCNC: 4.2 MMOL/L (ref 3.5–5.1)
SODIUM SERPL-SCNC: 133 MMOL/L (ref 136–145)

## 2021-11-20 NOTE — PROGRESS NOTES
Please call patient back about results  Persistent low sodium on labs  Chlorthalidone can cause this--stop chlorthalidone  Increase toprol xl to 50mg (on 25 now)--to maintain bp control    Monitor bp at home.   Repeat labs 1 week prior to f/U --bmp

## 2021-11-22 ENCOUNTER — TELEPHONE (OUTPATIENT)
Dept: INTERNAL MEDICINE CLINIC | Age: 61
End: 2021-11-22

## 2021-11-22 NOTE — PROGRESS NOTES
Called patient, verified name and , Informed of instructions and lab results, patient understands instructions and verbalizes understanding. Patient has no further questions at this time.

## 2021-11-23 ENCOUNTER — TELEPHONE (OUTPATIENT)
Dept: INTERNAL MEDICINE CLINIC | Age: 61
End: 2021-11-23

## 2021-11-23 DIAGNOSIS — I10 ESSENTIAL HYPERTENSION: Primary | ICD-10-CM

## 2021-11-23 RX ORDER — METOPROLOL SUCCINATE 50 MG/1
50 TABLET, EXTENDED RELEASE ORAL DAILY
Qty: 90 TABLET | Refills: 1 | Status: SHIPPED | OUTPATIENT
Start: 2021-11-23 | End: 2022-05-25 | Stop reason: SDUPTHER

## 2021-11-23 NOTE — PROGRESS NOTES
Lili Rocha at 21 1000    Status: Signed  Called patient, verified name and , Informed of instructions and lab results, patient understands instructions and verbalizes understanding. Patient has no further questions at this time.

## 2021-11-23 NOTE — TELEPHONE ENCOUNTER
Called, spoke with Pt. Two pt identifiers confirmed. Pt informed ignore earlier call, saw where someone spoke with her yesterday about her lab results. Pt verbalized understanding of information discussed w/ no further questions at this time.

## 2021-12-07 RX ORDER — AMLODIPINE BESYLATE 2.5 MG/1
2.5 TABLET ORAL DAILY
Qty: 30 TABLET | Refills: 2 | Status: SHIPPED | OUTPATIENT
Start: 2021-12-07 | End: 2022-01-27 | Stop reason: SDUPTHER

## 2022-01-27 ENCOUNTER — PATIENT MESSAGE (OUTPATIENT)
Dept: INTERNAL MEDICINE CLINIC | Age: 62
End: 2022-01-27

## 2022-01-27 DIAGNOSIS — I10 ESSENTIAL HYPERTENSION: Primary | ICD-10-CM

## 2022-01-27 RX ORDER — AMLODIPINE BESYLATE 5 MG/1
5 TABLET ORAL DAILY
Qty: 30 TABLET | Refills: 2 | Status: SHIPPED | OUTPATIENT
Start: 2022-01-27 | End: 2022-02-23 | Stop reason: SDUPTHER

## 2022-01-27 NOTE — TELEPHONE ENCOUNTER
Called, spoke with Pt. Two pt identifiers confirmed. Pt informed of medication increase of amlodipine per Dr. Jaimie Byrnes. Pt agreed to the changed and asked for new Rx to be sent to the pharmacy. Pt asked to set up a two week follow up with Dr. Jaimie Byrnes. Pt stated she will be going out of town and returning on the 02/18/2022. Pt offered and accepted in person appt on 02/23/22 with arrival time at 2:30pm.   Pt informed to keep check of BP with medication change and if it gets too high or low, please let us know. Pt verbalized understanding of information discussed w/ no further questions at this time.

## 2022-01-27 NOTE — TELEPHONE ENCOUNTER
From: Loretta Hensley  To: Melanie Rosenthal MD  Sent: 1/27/2022 10:52 AM EST  Subject: BP readings     Just wanted to share recent readings since starting the Norvasc. Wanted your thoughts on staying the course or changing meds. Thanks! Loretta Hensley    12/9 started the Norvasc 158/84.  Heart rate 73  12/10. 148/83. 70  12/11. 154/86. 75  12/12. 137/85. 76  12/13. 147/86. 80  12/14. 147/78. 53  12/15. 148/82. 57  12/16. 145/76. 79  12/17. 152/84. 75  12/20. 150/86. 68  12/21. 146/86. 61  12/22. 133/85. 90  12/25. 150/87. 74  12/26. 145/83. 76  12/27. 136/81. 82  12/28. 138/76. 83  12/30. 138/93. 81  12/31. 147/82. 66  1/3. 137/81. 76  1/4. 130/86. 71  1/5. 135/80. 73  1/6. 149/80. 56  1/9. 145/85. 65  1/10. 150/83. 59  1/11. 139/84. 76  1/12. 143/82. 70  1/13. 150/87. 80  1/14. 145/87. 82  1/17. 145/85. 61  1/18. 152/85. 63  1/19. 146/82. 72  1/20. 152/83. 57  1/25. 140/83. 66  1/27. 145/77. 65

## 2022-02-22 NOTE — PROGRESS NOTES
HISTORY OF PRESENT ILLNESS  Cecilia Dove is a 64 y.o. female. HPI     Last here 10/19/21. Here for routine care.     She has spent a lot of time in [facility] for the past 1-2 months       Has a history of hypertension  BP today is 146/83 repeat improved  BP at home 130s-140s/80 after starting norvasc   138/89 137/80 140/79 121/69 126/65 125/84 140/80s high 130s/80s   Discussed checking BP sitting and at rest, as well as after taking meds  She has been taking BP readings before taking meds  She is on lisinopril 40 mg, stopped chlorthalidone 25 mg, and toprol xl now 50 mg increased from 25mg  Now taking norvasc 5mg- wonders whether she should take this at night  Discussed that it is what is most convenient, might be a slight advantage to taking them at night but not very significant     She notes very minor swelling in ankles after starting norvasc, this does not bother her too much     Wt today is 184 lbs, stable x lov  Discussed diet and wt/l  She has been starting to walk more recently     Reviewed labs   Will get labs today     She takes fish oil and vit d3 2000U       Her prev doctor thought she had a heart attack & SSS   She had cardiac workup 6-7 years ago, stress echo, holter monitor - all nl per pt   12/14/15 nl stress echo       PREVENTIVE:  Colonoscopy:  Dr. Ball [place], [place] # [phone] 31-GTCI repeat   Dexa: unofficial one years ago, not yet needed    Mammo: [facility] Dr. Clotilde Brown 21-- will get report  Pap: Elizabeth Brown 21-- will get report  Tdap: 7/10/14  Pneumovax: not yet needed  Shingrix: completed both   Flu shot: 21  Eye exam: 3/19 [place]  Lipids: 10/21   A1c: 10/21 5.4  Hep C:  negative  Covid: three doses (Jerzy Ifeanyi)  EK21    Patient Active Problem List    Diagnosis Date Noted    Essential hypertension 10/09/2020    Mixed hyperlipidemia 10/09/2020    Vitamin D deficiency 10/09/2020     Current Outpatient Medications   Medication Sig Dispense Refill    amLODIPine (NORVASC) 5 mg tablet Take 1 Tablet by mouth daily. 90 Tablet 1    metoprolol succinate (TOPROL-XL) 50 mg XL tablet Take 1 Tablet by mouth daily. 90 Tablet 1    lisinopriL (PRINIVIL, ZESTRIL) 40 mg tablet Take 1 Tablet by mouth daily. 90 Tablet 1    docusate sodium (COLACE) 100 mg capsule Take 100 mg by mouth daily.  cholecalciferol (VITAMIN D3) (2,000 UNITS /50 MCG) cap capsule Take 2,000 Units by mouth daily.  omega 3-DHA-EPA-fish oil (Fish Oil) 1,000 mg (120 mg-180 mg) capsule Take 1 Cap by mouth daily. Past Surgical History:   Procedure Laterality Date    HX KNEE ARTHROSCOPY Left 10/2011    HX OTHER SURGICAL Left 1986    Left ankle Arthroscopy    HX OTHER SURGICAL Bilateral [id number]    Biliteral Cheilectomy on big toes      Lab Results   Component Value Date/Time    WBC 5.2 10/19/2021 10:34 AM    HGB 13.6 10/19/2021 10:34 AM    HCT 40.7 10/19/2021 10:34 AM    PLATELET 980 20/17/8016 10:34 AM    MCV 91.3 10/19/2021 10:34 AM     Lab Results   Component Value Date/Time    Cholesterol, total 189 10/19/2021 10:34 AM    HDL Cholesterol 37 10/19/2021 10:34 AM    LDL, calculated 122.2 (H) 10/19/2021 10:34 AM    Triglyceride 149 10/19/2021 10:34 AM    CHOL/HDL Ratio 5.1 (H) 10/19/2021 10:34 AM     Lab Results   Component Value Date/Time    GFR est non-AA >60 11/18/2021 09:53 AM    GFR est AA >60 11/18/2021 09:53 AM    Creatinine 0.90 11/18/2021 09:53 AM    BUN 12 11/18/2021 09:53 AM    Sodium 133 (L) 11/18/2021 09:53 AM    Potassium 4.2 11/18/2021 09:53 AM    Chloride 100 11/18/2021 09:53 AM    CO2 26 11/18/2021 09:53 AM        Review of Systems   Respiratory: Negative for shortness of breath. Cardiovascular: Negative for chest pain. Physical Exam  Constitutional:       General: She is not in acute distress. Appearance: Normal appearance. She is not ill-appearing, toxic-appearing or diaphoretic. HENT:      Head: Normocephalic and atraumatic.       Right Ear: External ear normal.      Left Ear: External ear normal.   Eyes:      General:         Right eye: No discharge. Left eye: No discharge. Conjunctiva/sclera: Conjunctivae normal.      Pupils: Pupils are equal, round, and reactive to light. Cardiovascular:      Rate and Rhythm: Normal rate and regular rhythm. Heart sounds: No murmur heard. No friction rub. No gallop. Pulmonary:      Effort: No respiratory distress. Breath sounds: Normal breath sounds. No wheezing or rales. Chest:      Chest wall: No tenderness. Musculoskeletal:         General: Normal range of motion. Cervical back: Normal range of motion and neck supple. Right lower leg: No edema. Left lower leg: No edema. Skin:     General: Skin is warm and dry. Neurological:      Mental Status: She is alert and oriented to person, place, and time. Mental status is at baseline. Coordination: Coordination normal.      Gait: Gait normal.   Psychiatric:         Mood and Affect: Mood normal.         Behavior: Behavior normal.         ASSESSMENT and PLAN    ICD-10-CM ICD-9-CM    1. Essential hypertension    X69 977.9 METABOLIC PANEL, BASIC   Adequately controlled on Toprol lisinopril and Norvasc continue       amLODIPine (NORVASC) 5 mg tablet   2. Mixed hyperlipidemia diet controlled E78.2 272.2    3. Vitamin D deficiency continue over-the-counter vitamin D E55.9 268.9     4 hyponatremia--related to chlorthalidone we will repeat metabolic panel today no longer on chlorthalidone    Depression screen reviewed and negative     Scribed by Lizzie Bowser [facility], as dictated by Dr. Megha Walker. Current diagnosis and concerns discussed with pt at length. Pt understands risks and benefits or current treatment plan and medications, and accepts the treatment and medication with any possible risks. Pt asks appropriate questions, which were answered. Pt was instructed to call with any concerns or problems. I have reviewed the note documented by the scribe. The services provided are my own. The documentation is accurate     Rodríguez Leon, was evaluated through a synchronous (real-time) audio-video encounter. The patient (or guardian if applicable) is aware that this is a billable service. Verbal consent to proceed has been obtained. The visit was conducted pursuant to the emergency declaration under the [address] [place] [address] and the [employer] General Act. Patient identification was verified, and a caregiver was present when appropriate. The patient was located at home in a state where the provider was licensed to provide care.

## 2022-02-23 ENCOUNTER — OFFICE VISIT (OUTPATIENT)
Dept: INTERNAL MEDICINE CLINIC | Age: 62
End: 2022-02-23
Payer: OTHER GOVERNMENT

## 2022-02-23 VITALS
HEART RATE: 78 BPM | OXYGEN SATURATION: 100 % | WEIGHT: 184 LBS | DIASTOLIC BLOOD PRESSURE: 84 MMHG | BODY MASS INDEX: 27.89 KG/M2 | SYSTOLIC BLOOD PRESSURE: 131 MMHG | RESPIRATION RATE: 16 BRPM | TEMPERATURE: 97.7 F | HEIGHT: 68 IN

## 2022-02-23 DIAGNOSIS — E55.9 VITAMIN D DEFICIENCY: ICD-10-CM

## 2022-02-23 DIAGNOSIS — E78.2 MIXED HYPERLIPIDEMIA: ICD-10-CM

## 2022-02-23 DIAGNOSIS — I10 ESSENTIAL HYPERTENSION: Primary | ICD-10-CM

## 2022-02-23 LAB
ANION GAP SERPL CALC-SCNC: 5 MMOL/L (ref 5–15)
BUN SERPL-MCNC: 15 MG/DL (ref 6–20)
BUN/CREAT SERPL: 16 (ref 12–20)
CALCIUM SERPL-MCNC: 9.5 MG/DL (ref 8.5–10.1)
CHLORIDE SERPL-SCNC: 107 MMOL/L (ref 97–108)
CO2 SERPL-SCNC: 26 MMOL/L (ref 21–32)
CREAT SERPL-MCNC: 0.96 MG/DL (ref 0.55–1.02)
GLUCOSE SERPL-MCNC: 95 MG/DL (ref 65–100)
POTASSIUM SERPL-SCNC: 4.5 MMOL/L (ref 3.5–5.1)
SODIUM SERPL-SCNC: 138 MMOL/L (ref 136–145)

## 2022-02-23 PROCEDURE — 99213 OFFICE O/P EST LOW 20 MIN: CPT | Performed by: INTERNAL MEDICINE

## 2022-02-23 RX ORDER — AMLODIPINE BESYLATE 5 MG/1
5 TABLET ORAL DAILY
Qty: 90 TABLET | Refills: 1 | Status: SHIPPED | OUTPATIENT
Start: 2022-02-23 | End: 2022-05-25 | Stop reason: SDUPTHER

## 2022-02-23 NOTE — LETTER
Dear Care Provider    Please fax us the most recent mammo so that we may update the patient's records for continuity of care. Our fax number: [phone].     Patient:   Bjorn Gomez  1960           Sincerely,      Raymond Jaquez MD

## 2022-03-18 PROBLEM — E78.2 MIXED HYPERLIPIDEMIA: Status: ACTIVE | Noted: 2020-10-09

## 2022-03-18 PROBLEM — E55.9 VITAMIN D DEFICIENCY: Status: ACTIVE | Noted: 2020-10-09

## 2022-03-19 PROBLEM — I10 ESSENTIAL HYPERTENSION: Status: ACTIVE | Noted: 2020-10-09

## 2022-08-09 NOTE — PROGRESS NOTES
HISTORY OF PRESENT ILLNESS  Anuradha Valdivia is a 58 y.o. female. HPI    Last here 22. Here for routine care. Patient went to [facility] back in May had a good vacation traveled around [facility] in [facility] to the beach next week  Has some stress with 's recent diagnosis of prostate cancer which would be treated in the near future     Has a history of hypertension  BP today is 149/96 repeat improved 136/84  BP at home 116/77 121/72 106/64 121/81 133/69 142/81 130/63 109/70 117/79  She is on lisinopril 40 mg, toprol xl 50 mg, and norvasc 5mg  Stopped chlorthalidone 25 mg lov due to hyponatremia. Lov noted very minor swelling in ankles after starting norvasc, this does not bother her too much     Wt today is 177 lbs, down 7 lbs since lov. Discussed diet and wt/l  Pt states she has been trying hard to lose 10 lbs per year. States she has been watching portions, making better choices. She has been walking more, but has lots of problems with her feet. Reviewed labs   Will get labs today, pt ate a kind bar at 9am today     She takes fish oil and vit d3 2000U     Her prev doctor thought she had a heart attack & SSS   She had cardiac workup 6-7 years ago, stress echo, holter monitor - all nl per pt   12/14/15 nl stress echo    Of note, her  was recently dx'd w/ prostate cancer.         PREVENTIVE:  Colonoscopy:  Dr. Chester Keller, [place] # [phone] 10-year repeat, will work on getting report  Dexa: unofficial one years ago, not yet needed    Mammo: 606/706 Janes Elaine 21, ordered  Pap: 606/706 Janes Elaine 21, ordered   Tdap: 7/10/14  Pneumovax: not yet needed  Shingrix: completed both   Flu shot: 21  Eye exam:  [place]  Lipids: 10/21   A1c: 10/21 5.4  Hep C:  negative  Covid: four doses (Rossy Cullen) (?), most recent booster   EK21      Patient Active Problem List    Diagnosis Date Noted    Essential hypertension 10/09/2020    Mixed hyperlipidemia 10/09/2020    Vitamin D deficiency 10/09/2020     Current Outpatient Medications   Medication Sig Dispense Refill    lisinopriL (PRINIVIL, ZESTRIL) 40 mg tablet Take 1 Tablet by mouth daily. 90 Tablet 0    metoprolol succinate (TOPROL-XL) 50 mg XL tablet Take 1 Tablet by mouth daily. 90 Tablet 1    amLODIPine (NORVASC) 5 mg tablet Take 1 Tablet by mouth daily. 90 Tablet 0    docusate sodium (COLACE) 100 mg capsule Take 100 mg by mouth daily. cholecalciferol (VITAMIN D3) (2,000 UNITS /50 MCG) cap capsule Take 2,000 Units by mouth daily. omega 3-DHA-EPA-fish oil (Fish Oil) 1,000 mg (120 mg-180 mg) capsule Take 1 Cap by mouth daily. Past Surgical History:   Procedure Laterality Date    HX KNEE ARTHROSCOPY Left 10/2011    HX OTHER SURGICAL Left 1986    Left ankle Arthroscopy    HX OTHER SURGICAL Bilateral [id number]    Biliteral Cheilectomy on big toes      Lab Results   Component Value Date/Time    WBC 5.2 10/19/2021 10:34 AM    HGB 13.6 10/19/2021 10:34 AM    HCT 40.7 10/19/2021 10:34 AM    PLATELET 449 68/43/5426 10:34 AM    MCV 91.3 10/19/2021 10:34 AM     Lab Results   Component Value Date/Time    Cholesterol, total 189 10/19/2021 10:34 AM    HDL Cholesterol 37 10/19/2021 10:34 AM    LDL, calculated 122.2 (H) 10/19/2021 10:34 AM    Triglyceride 149 10/19/2021 10:34 AM    CHOL/HDL Ratio 5.1 (H) 10/19/2021 10:34 AM     Lab Results   Component Value Date/Time    GFR est non-AA 59 (L) 02/23/2022 03:15 PM    GFR est AA >60 02/23/2022 03:15 PM    Creatinine 0.96 02/23/2022 03:15 PM    BUN 15 02/23/2022 03:15 PM    Sodium 138 02/23/2022 03:15 PM    Potassium 4.5 02/23/2022 03:15 PM    Chloride 107 02/23/2022 03:15 PM    CO2 26 02/23/2022 03:15 PM        Review of Systems   Respiratory:  Negative for shortness of breath. Cardiovascular:  Negative for chest pain. Physical Exam  Constitutional:       General: She is not in acute distress.      Appearance: She is not ill-appearing, toxic-appearing or diaphoretic. HENT:      Head: Normocephalic and atraumatic. Right Ear: External ear normal.      Left Ear: External ear normal.   Eyes:      General:         Right eye: No discharge. Left eye: No discharge. Conjunctiva/sclera: Conjunctivae normal.      Pupils: Pupils are equal, round, and reactive to light. Cardiovascular:      Rate and Rhythm: Normal rate and regular rhythm. Pulses: Normal pulses. Heart sounds: No murmur heard. No friction rub. No gallop. Pulmonary:      Effort: No respiratory distress. Breath sounds: Normal breath sounds. No wheezing or rales. Chest:      Chest wall: No tenderness. Abdominal:      General: Abdomen is flat. Bowel sounds are normal.      Palpations: Abdomen is soft. Musculoskeletal:         General: Normal range of motion. Cervical back: Normal.      Left lower leg: No edema. Skin:     General: Skin is warm and dry. Neurological:      Mental Status: She is oriented to person, place, and time. Mental status is at baseline. Coordination: Coordination normal.      Gait: Gait normal.   Psychiatric:         Mood and Affect: Mood normal.         Behavior: Behavior normal.       ASSESSMENT and PLAN    ICD-10-CM ICD-9-CM    1. Physical exam  Z00.00 V70.9 REFERRAL TO OBSTETRICS AND GYNECOLOGY      LIPID PANEL   Discussed diet and weight loss she is doing a great job she is very motivated to continue to lose weight congratulated her    Labs ordered of note she did eat earlier today but just a small amount will take this into account    Needs referral for gynecologist which I placed the order will have follow-up for pelvic and mammo in November    Eye exam is coming up due she will get that done    Flu shot COVID-vaccine up-to-date Shingrix up-to-date   METABOLIC PANEL, COMPREHENSIVE      HEMOGLOBIN A1C WITH EAG      TSH 3RD GENERATION      CBC W/O DIFF      2.  Essential hypertension  I10 401.9    Initially elevated repeat improved at home her readings have been improving especially with weight loss    Continue lisinopril Toprol and Norvasc   3. Mixed hyperlipidemia  E78.2 272.2    Check level currently diet controlled treat as needed weight improve lipids to be improved       4. Vitamin D deficiency  E55.9 268.9    Continue over-the-counter vitamin D     Depression screen reviewed and negative. Scribed by Mariah Mcneil, as dictated by Dr. Earlene Mcmullen. Current diagnosis and concerns discussed with pt at length. Pt understands risks and benefits or current treatment plan and medications, and accepts the treatment and medication with any possible risks. Pt asks appropriate questions, which were answered. Pt was instructed to call with any concerns or problems. I have reviewed the note documented by the scribe. The services provided are my own. The documentation is accurate.

## 2022-08-10 ENCOUNTER — OFFICE VISIT (OUTPATIENT)
Dept: INTERNAL MEDICINE CLINIC | Age: 62
End: 2022-08-10
Payer: OTHER GOVERNMENT

## 2022-08-10 VITALS
RESPIRATION RATE: 16 BRPM | SYSTOLIC BLOOD PRESSURE: 136 MMHG | DIASTOLIC BLOOD PRESSURE: 84 MMHG | BODY MASS INDEX: 26.83 KG/M2 | TEMPERATURE: 98.1 F | HEART RATE: 78 BPM | HEIGHT: 68 IN | OXYGEN SATURATION: 100 % | WEIGHT: 177 LBS

## 2022-08-10 DIAGNOSIS — Z00.00 PHYSICAL EXAM: Primary | ICD-10-CM

## 2022-08-10 DIAGNOSIS — I10 ESSENTIAL HYPERTENSION: ICD-10-CM

## 2022-08-10 DIAGNOSIS — E78.2 MIXED HYPERLIPIDEMIA: ICD-10-CM

## 2022-08-10 DIAGNOSIS — E55.9 VITAMIN D DEFICIENCY: ICD-10-CM

## 2022-08-10 PROCEDURE — 99214 OFFICE O/P EST MOD 30 MIN: CPT | Performed by: INTERNAL MEDICINE

## 2022-08-10 NOTE — LETTER
8/10/2022 1:30 PM    Ms. Adams Getting  [address] [place] [facility] [address]    Dear Care Provider    Please fax us the most recent colonoscopy so that we may update the patient's records for continuity of care. Our fax number: [phone].     Patient:   Bryan Getting  1960        Sincerely,      Megan Gracia MD

## 2022-08-11 LAB
ALBUMIN SERPL-MCNC: 4 G/DL (ref 3.5–5)
ALBUMIN/GLOB SERPL: 1.1 {RATIO} (ref 1.1–2.2)
ALP SERPL-CCNC: 113 U/L (ref 45–117)
ALT SERPL-CCNC: 16 U/L (ref 12–78)
ANION GAP SERPL CALC-SCNC: 8 MMOL/L (ref 5–15)
AST SERPL-CCNC: 14 U/L (ref 15–37)
BILIRUB SERPL-MCNC: 0.6 MG/DL (ref 0.2–1)
BUN SERPL-MCNC: 13 MG/DL (ref 6–20)
BUN/CREAT SERPL: 14 (ref 12–20)
CALCIUM SERPL-MCNC: 9.6 MG/DL (ref 8.5–10.1)
CHLORIDE SERPL-SCNC: 104 MMOL/L (ref 97–108)
CHOLEST SERPL-MCNC: 211 MG/DL
CO2 SERPL-SCNC: 26 MMOL/L (ref 21–32)
CREAT SERPL-MCNC: 0.96 MG/DL (ref 0.55–1.02)
ERYTHROCYTE [DISTWIDTH] IN BLOOD BY AUTOMATED COUNT: 12.3 % (ref 11.5–14.5)
EST. AVERAGE GLUCOSE BLD GHB EST-MCNC: 103 MG/DL
GLOBULIN SER CALC-MCNC: 3.8 G/DL (ref 2–4)
GLUCOSE SERPL-MCNC: 92 MG/DL (ref 65–100)
HBA1C MFR BLD: 5.2 % (ref 4–5.6)
HCT VFR BLD AUTO: 40.7 % (ref 35–47)
HDLC SERPL-MCNC: 41 MG/DL
HDLC SERPL: 5.1 {RATIO} (ref 0–5)
HGB BLD-MCNC: 14.1 G/DL (ref 11.5–16)
LDLC SERPL CALC-MCNC: 137.4 MG/DL (ref 0–100)
MCH RBC QN AUTO: 31.1 PG (ref 26–34)
MCHC RBC AUTO-ENTMCNC: 34.6 G/DL (ref 30–36.5)
MCV RBC AUTO: 89.8 FL (ref 80–99)
NRBC # BLD: 0 K/UL (ref 0–0.01)
NRBC BLD-RTO: 0 PER 100 WBC
PLATELET # BLD AUTO: 276 K/UL (ref 150–400)
PMV BLD AUTO: 10.3 FL (ref 8.9–12.9)
POTASSIUM SERPL-SCNC: 4.3 MMOL/L (ref 3.5–5.1)
PROT SERPL-MCNC: 7.8 G/DL (ref 6.4–8.2)
RBC # BLD AUTO: 4.53 M/UL (ref 3.8–5.2)
SODIUM SERPL-SCNC: 138 MMOL/L (ref 136–145)
TRIGL SERPL-MCNC: 163 MG/DL (ref ?–150)
TSH SERPL DL<=0.05 MIU/L-ACNC: 0.85 UIU/ML (ref 0.36–3.74)
VLDLC SERPL CALC-MCNC: 32.6 MG/DL
WBC # BLD AUTO: 5.4 K/UL (ref 3.6–11)

## 2022-08-16 DIAGNOSIS — I10 ESSENTIAL HYPERTENSION: ICD-10-CM

## 2022-08-16 RX ORDER — LISINOPRIL 40 MG/1
40 TABLET ORAL DAILY
Qty: 90 TABLET | Refills: 0 | Status: SHIPPED | OUTPATIENT
Start: 2022-08-16

## 2022-08-16 RX ORDER — AMLODIPINE BESYLATE 5 MG/1
5 TABLET ORAL DAILY
Qty: 90 TABLET | Refills: 0 | Status: SHIPPED | OUTPATIENT
Start: 2022-08-16

## 2022-08-16 RX ORDER — METOPROLOL SUCCINATE 50 MG/1
50 TABLET, EXTENDED RELEASE ORAL DAILY
Qty: 90 TABLET | Refills: 1 | Status: SHIPPED | OUTPATIENT
Start: 2022-08-16

## 2022-08-16 NOTE — TELEPHONE ENCOUNTER
PCP: Karthikeyan Davidson MD    Last appt: 8/10/2022  Future Appointments   Date Time Provider Terri Reis   2/28/2023 11:15 AM Karthikeyan Davidson MD [facility] BS AMB       Requested Prescriptions     Pending Prescriptions Disp Refills    lisinopriL (PRINIVIL, ZESTRIL) 40 mg tablet 90 Tablet 0     Sig: Take 1 Tablet by mouth in the morning. metoprolol succinate (TOPROL-XL) 50 mg XL tablet 90 Tablet 1     Sig: Take 1 Tablet by mouth in the morning. amLODIPine (NORVASC) 5 mg tablet 90 Tablet 0     Sig: Take 1 Tablet by mouth in the morning.        Prior labs and Blood pressures:  BP Readings from Last 3 Encounters:   08/10/22 136/84   02/23/22 131/84   10/19/21 133/79     Lab Results   Component Value Date/Time    Sodium 138 08/10/2022 12:36 PM    Potassium 4.3 08/10/2022 12:36 PM    Chloride 104 08/10/2022 12:36 PM    CO2 26 08/10/2022 12:36 PM    Anion gap 8 08/10/2022 12:36 PM    Glucose 92 08/10/2022 12:36 PM    BUN 13 08/10/2022 12:36 PM    Creatinine 0.96 08/10/2022 12:36 PM    BUN/Creatinine ratio 14 08/10/2022 12:36 PM    GFR est AA >60 08/10/2022 12:36 PM    GFR est non-AA 59 (L) 08/10/2022 12:36 PM    Calcium 9.6 08/10/2022 12:36 PM     Lab Results   Component Value Date/Time    Hemoglobin A1c 5.2 08/10/2022 12:36 PM     Lab Results   Component Value Date/Time    Cholesterol, total 211 (H) 08/10/2022 12:36 PM    HDL Cholesterol 41 08/10/2022 12:36 PM    LDL, calculated 137.4 (H) 08/10/2022 12:36 PM    VLDL, calculated 32.6 08/10/2022 12:36 PM    Triglyceride 163 (H) 08/10/2022 12:36 PM    CHOL/HDL Ratio 5.1 (H) 08/10/2022 12:36 PM     Lab Results   Component Value Date/Time    VITAMIN D, 25-HYDROXY 43.4 11/05/2020 09:51 AM       Lab Results   Component Value Date/Time    TSH 0.85 08/10/2022 12:36 PM

## 2022-11-18 ENCOUNTER — TELEPHONE (OUTPATIENT)
Dept: INTERNAL MEDICINE CLINIC | Age: 62
End: 2022-11-18

## 2022-11-18 NOTE — TELEPHONE ENCOUNTER
See [employer] message from 11/17/22. Called patient to obtain more information on her referral request. Patient stated that she will be seeing Dr. Jyoti Sotelo on 11/30/22. Patient stated that she will be going to the [address] [place] [address] location in [address]. Advised patient that I would submit the referral and call her when its been approved.

## 2022-11-21 NOTE — TELEPHONE ENCOUNTER
Referral has been approved and faxed to Dr. Wagner Nunez office at [phone]. Called and notified patient.

## 2022-12-08 DIAGNOSIS — I10 ESSENTIAL HYPERTENSION: ICD-10-CM

## 2022-12-08 RX ORDER — AMLODIPINE BESYLATE 5 MG/1
5 TABLET ORAL DAILY
Qty: 90 TABLET | Refills: 0 | Status: SHIPPED | OUTPATIENT
Start: 2022-12-08

## 2022-12-08 RX ORDER — LISINOPRIL 40 MG/1
40 TABLET ORAL DAILY
Qty: 90 TABLET | Refills: 0 | Status: SHIPPED | OUTPATIENT
Start: 2022-12-08

## 2022-12-08 RX ORDER — METOPROLOL SUCCINATE 50 MG/1
50 TABLET, EXTENDED RELEASE ORAL DAILY
Qty: 90 TABLET | Refills: 1 | Status: SHIPPED | OUTPATIENT
Start: 2022-12-08

## 2022-12-08 NOTE — TELEPHONE ENCOUNTER
Future Appointments:  Future Appointments   Date Time Provider Terri Reis   2/28/2023 11:15 AM Roxana Yo MD [facility] BS AMB        Last Appointment With Me:  8/10/2022     Requested Prescriptions     Pending Prescriptions Disp Refills    lisinopriL (PRINIVIL, ZESTRIL) 40 mg tablet 90 Tablet 0     Sig: Take 1 Tablet by mouth daily. metoprolol succinate (TOPROL-XL) 50 mg XL tablet 90 Tablet 1     Sig: Take 1 Tablet by mouth daily. amLODIPine (NORVASC) 5 mg tablet 90 Tablet 0     Sig: Take 1 Tablet by mouth daily.

## 2023-02-27 NOTE — PROGRESS NOTES
HISTORY OF PRESENT ILLNESS  Farooq Avila is a 58 y.o. female. HPI  Last here 8/10/22. Here for routine care. She is going to [facility] in 10/23. Has a history of hypertension  BP today is 128/78  BP at home 130/79 132/67 138/75 135/68 125/78 118/68 133/86 146/86 132/76 136/77   HR 50s-70s   She is on lisinopril 40 mg, toprol xl 50 mg, and norvasc 5mg  Stopped chlorthalidone 25 mg lov due to hyponatremia. Pt has previously noted very minor swelling in ankles after starting norvasc, this does not bother her too much     Wt today is 177 lbs, stable since lov   Discussed diet and wt/l, she is just above the nl range  Notes she recently returned from a cruise, is proud of herself for not gaining weight      Reviewed labs   Will get labs today  Discussed elevated LDL levels on last labs and CT Ca heart score. She will think about it. Her previous doctor thought she had a heart attack & SSS   She had cardiac workup 6-7 years ago, stress echo, holter monitor - all nl per pt   12/14/15 nl stress echo    She takes fish oil and vit d3 2000U    Of note, her  was recently dx'd w/ prostate cancer.      Reviewed colo  Dr. Chico Duffy, 10 year repeat     Reviewed mammo 22: [facility], nl        PREVENTIVE:  Colonoscopy:  Dr. Todd Silva, [place] # [phone] 10-year repeat  Dexa: unofficial one years ago, not yet needed    Mammo: 606/706 Janes Gills 22 nl  Pap: 606/706 Janes Gills 22   Tdap: 7/10/14, due    Pneumovax: not yet needed  Shingrix: completed both   Flu shot: 22   Eye exam:  [place], due reminded   Lipids:     A1c: 10/21 5.4  5.2   Hep C:  negative  Covid: 5 doses (Durango Gasman), including bivalent  EK21    Patient Active Problem List    Diagnosis Date Noted    Essential hypertension 10/09/2020    Mixed hyperlipidemia 10/09/2020    Vitamin D deficiency 10/09/2020     Current Outpatient Medications   Medication Sig Dispense Refill lisinopriL (PRINIVIL, ZESTRIL) 40 mg tablet Take 1 Tablet by mouth daily. 90 Tablet 0    metoprolol succinate (TOPROL-XL) 50 mg XL tablet Take 1 Tablet by mouth daily. 90 Tablet 1    amLODIPine (NORVASC) 5 mg tablet Take 1 Tablet by mouth daily. 90 Tablet 0    docusate sodium (COLACE) 100 mg capsule Take 100 mg by mouth daily. cholecalciferol (VITAMIN D3) (2,000 UNITS /50 MCG) cap capsule Take 2,000 Units by mouth daily. omega 3-DHA-EPA-fish oil 1,000 mg (120 mg-180 mg) capsule Take 1 Cap by mouth daily. Past Surgical History:   Procedure Laterality Date    HX KNEE ARTHROSCOPY Left 10/2011    HX OTHER SURGICAL Left 1986    Left ankle Arthroscopy    HX OTHER SURGICAL Bilateral [id number]    Biliteral Cheilectomy on big toes      Lab Results   Component Value Date/Time    WBC 5.4 08/10/2022 12:36 PM    HGB 14.1 08/10/2022 12:36 PM    HCT 40.7 08/10/2022 12:36 PM    PLATELET 110 43/70/2294 12:36 PM    MCV 89.8 08/10/2022 12:36 PM     Lab Results   Component Value Date/Time    Cholesterol, total 211 (H) 08/10/2022 12:36 PM    HDL Cholesterol 41 08/10/2022 12:36 PM    LDL, calculated 137.4 (H) 08/10/2022 12:36 PM    Triglyceride 163 (H) 08/10/2022 12:36 PM    CHOL/HDL Ratio 5.1 (H) 08/10/2022 12:36 PM     Lab Results   Component Value Date/Time    GFR est non-AA 59 (L) 08/10/2022 12:36 PM    GFR est AA >60 08/10/2022 12:36 PM    Creatinine 0.96 08/10/2022 12:36 PM    BUN 13 08/10/2022 12:36 PM    Sodium 138 08/10/2022 12:36 PM    Potassium 4.3 08/10/2022 12:36 PM    Chloride 104 08/10/2022 12:36 PM    CO2 26 08/10/2022 12:36 PM        Review of Systems   Respiratory:  Negative for shortness of breath. Cardiovascular:  Negative for chest pain. Physical Exam  Constitutional:       General: She is not in acute distress. Appearance: She is not ill-appearing, toxic-appearing or diaphoretic. HENT:      Head: Normocephalic and atraumatic.       Right Ear: External ear normal.      Left Ear: External ear normal.   Eyes:      General:         Right eye: No discharge. Left eye: No discharge. Conjunctiva/sclera: Conjunctivae normal.      Pupils: Pupils are equal, round, and reactive to light. Cardiovascular:      Rate and Rhythm: Normal rate and regular rhythm. Heart sounds: No murmur heard. No friction rub. No gallop. Pulmonary:      Effort: No respiratory distress. Breath sounds: Normal breath sounds. No wheezing or rales. Chest:      Chest wall: No tenderness. Musculoskeletal:         General: Normal range of motion. Cervical back: Normal.      Right lower leg: No edema. Left lower leg: No edema. Skin:     General: Skin is warm and dry. Neurological:      Mental Status: She is oriented to person, place, and time. Mental status is at baseline. Coordination: Coordination normal.      Gait: Gait normal.   Psychiatric:         Mood and Affect: Mood normal.         Behavior: Behavior normal.       ASSESSMENT and PLAN    ICD-10-CM ICD-9-CM    1. Mixed hyperlipidemia  E78.2 272.2    Diet controlled    Repeat lipids at follow-up    Continue to work on weight loss    Discussed CT coronary calcium score she will think about this   2. Essential hypertension  I10 401.9 metoprolol succinate (TOPROL-XL) 50 mg XL tablet   Check metabolic panel for K creatinine sodium levels controlled on Norvasc and Toprol continue   amLODIPine (NORVASC) 5 mg tablet      3. Vitamin D deficiency  E55.9 268.9    Continue over-the-counter vitamin D     Depression screen reviewed and negative. Scribed by Joyce Ritter, as dictated by Dr. Zoraida Painter. Current diagnosis and concerns discussed with pt at length. Pt understands risks and benefits or current treatment plan and medications, and accepts the treatment and medication with any possible risks. Pt asks appropriate questions, which were answered. Pt was instructed to call with any concerns or problems.     I have reviewed the note documented by the scribe. The services provided are my own. The documentation is accurate.

## 2023-02-28 ENCOUNTER — OFFICE VISIT (OUTPATIENT)
Dept: INTERNAL MEDICINE CLINIC | Age: 63
End: 2023-02-28

## 2023-02-28 VITALS
HEART RATE: 81 BPM | WEIGHT: 177 LBS | RESPIRATION RATE: 16 BRPM | SYSTOLIC BLOOD PRESSURE: 128 MMHG | BODY MASS INDEX: 26.83 KG/M2 | OXYGEN SATURATION: 98 % | TEMPERATURE: 98 F | DIASTOLIC BLOOD PRESSURE: 78 MMHG | HEIGHT: 68 IN

## 2023-02-28 DIAGNOSIS — E55.9 VITAMIN D DEFICIENCY: ICD-10-CM

## 2023-02-28 DIAGNOSIS — E78.2 MIXED HYPERLIPIDEMIA: Primary | ICD-10-CM

## 2023-02-28 DIAGNOSIS — I10 ESSENTIAL HYPERTENSION: ICD-10-CM

## 2023-02-28 RX ORDER — METOPROLOL SUCCINATE 50 MG/1
50 TABLET, EXTENDED RELEASE ORAL DAILY
Qty: 90 TABLET | Refills: 1 | Status: SHIPPED | OUTPATIENT
Start: 2023-02-28

## 2023-02-28 RX ORDER — AMLODIPINE BESYLATE 5 MG/1
5 TABLET ORAL DAILY
Qty: 90 TABLET | Refills: 0 | Status: SHIPPED | OUTPATIENT
Start: 2023-02-28

## 2023-02-28 RX ORDER — LISINOPRIL 40 MG/1
40 TABLET ORAL DAILY
Qty: 90 TABLET | Refills: 0 | Status: SHIPPED | OUTPATIENT
Start: 2023-02-28

## 2023-02-28 NOTE — PROGRESS NOTES
1. \"Have you been to the ER, urgent care clinic since your last visit? Hospitalized since your last visit? \" No    2. \"Have you seen or consulted any other health care providers outside of the [address] [place] [address] since your last visit? \" No     3. For patients aged 39-70: Has the patient had a colonoscopy / FIT/ Cologuard? Yes - Care Gap present. Most recent result on file      If the patient is female:    4. For patients aged 41-77: Has the patient had a mammogram within the past 2 years? Yes - Care Gap present. Most recent result on file      5. For patients aged 21-65: Has the patient had a pap smear? Yes - Care Gap present.  Most recent result on file

## 2023-03-01 LAB
ALBUMIN SERPL-MCNC: 4 G/DL (ref 3.5–5)
ALBUMIN/GLOB SERPL: 1 (ref 1.1–2.2)
ALP SERPL-CCNC: 104 U/L (ref 45–117)
ALT SERPL-CCNC: 15 U/L (ref 12–78)
ANION GAP SERPL CALC-SCNC: 6 MMOL/L (ref 5–15)
AST SERPL-CCNC: 17 U/L (ref 15–37)
BILIRUB SERPL-MCNC: 0.6 MG/DL (ref 0.2–1)
BUN SERPL-MCNC: 16 MG/DL (ref 6–20)
BUN/CREAT SERPL: 15 (ref 12–20)
CALCIUM SERPL-MCNC: 9.7 MG/DL (ref 8.5–10.1)
CHLORIDE SERPL-SCNC: 104 MMOL/L (ref 97–108)
CO2 SERPL-SCNC: 28 MMOL/L (ref 21–32)
CREAT SERPL-MCNC: 1.07 MG/DL (ref 0.55–1.02)
GLOBULIN SER CALC-MCNC: 4 G/DL (ref 2–4)
GLUCOSE SERPL-MCNC: 97 MG/DL (ref 65–100)
POTASSIUM SERPL-SCNC: 4.1 MMOL/L (ref 3.5–5.1)
PROT SERPL-MCNC: 8 G/DL (ref 6.4–8.2)
SODIUM SERPL-SCNC: 138 MMOL/L (ref 136–145)

## 2023-05-25 RX ORDER — LISINOPRIL 40 MG/1
40 TABLET ORAL DAILY
Qty: 90 TABLET | Refills: 0 | Status: SHIPPED | OUTPATIENT
Start: 2023-05-25 | End: 2023-05-31 | Stop reason: SDUPTHER

## 2023-05-25 RX ORDER — AMLODIPINE BESYLATE 5 MG/1
5 TABLET ORAL DAILY
Qty: 90 TABLET | Refills: 0 | Status: SHIPPED | OUTPATIENT
Start: 2023-05-25 | End: 2023-05-31 | Stop reason: SDUPTHER

## 2023-08-23 ASSESSMENT — ENCOUNTER SYMPTOMS: SHORTNESS OF BREATH: 0

## 2023-08-26 SDOH — ECONOMIC STABILITY: FOOD INSECURITY: WITHIN THE PAST 12 MONTHS, THE FOOD YOU BOUGHT JUST DIDN'T LAST AND YOU DIDN'T HAVE MONEY TO GET MORE.: NEVER TRUE

## 2023-08-26 SDOH — ECONOMIC STABILITY: TRANSPORTATION INSECURITY
IN THE PAST 12 MONTHS, HAS LACK OF TRANSPORTATION KEPT YOU FROM MEETINGS, WORK, OR FROM GETTING THINGS NEEDED FOR DAILY LIVING?: NO

## 2023-08-26 SDOH — ECONOMIC STABILITY: INCOME INSECURITY: HOW HARD IS IT FOR YOU TO PAY FOR THE VERY BASICS LIKE FOOD, HOUSING, MEDICAL CARE, AND HEATING?: NOT HARD AT ALL

## 2023-08-26 SDOH — ECONOMIC STABILITY: HOUSING INSECURITY
IN THE LAST 12 MONTHS, WAS THERE A TIME WHEN YOU DID NOT HAVE A STEADY PLACE TO SLEEP OR SLEPT IN A SHELTER (INCLUDING NOW)?: NO

## 2023-08-26 SDOH — ECONOMIC STABILITY: FOOD INSECURITY: WITHIN THE PAST 12 MONTHS, YOU WORRIED THAT YOUR FOOD WOULD RUN OUT BEFORE YOU GOT MONEY TO BUY MORE.: NEVER TRUE

## 2023-08-29 ENCOUNTER — OFFICE VISIT (OUTPATIENT)
Age: 63
End: 2023-08-29
Payer: OTHER GOVERNMENT

## 2023-08-29 VITALS
WEIGHT: 180 LBS | DIASTOLIC BLOOD PRESSURE: 82 MMHG | RESPIRATION RATE: 18 BRPM | HEART RATE: 84 BPM | OXYGEN SATURATION: 100 % | HEIGHT: 68 IN | BODY MASS INDEX: 27.28 KG/M2 | TEMPERATURE: 98.3 F | SYSTOLIC BLOOD PRESSURE: 135 MMHG

## 2023-08-29 DIAGNOSIS — E78.2 MIXED HYPERLIPIDEMIA: ICD-10-CM

## 2023-08-29 DIAGNOSIS — Z00.00 PHYSICAL EXAM: Primary | ICD-10-CM

## 2023-08-29 DIAGNOSIS — E55.9 VITAMIN D DEFICIENCY: ICD-10-CM

## 2023-08-29 DIAGNOSIS — I10 ESSENTIAL HYPERTENSION: ICD-10-CM

## 2023-08-29 LAB
ALBUMIN SERPL-MCNC: 3.9 G/DL (ref 3.5–5)
ALBUMIN/GLOB SERPL: 1.1 (ref 1.1–2.2)
ALP SERPL-CCNC: 112 U/L (ref 45–117)
ALT SERPL-CCNC: 15 U/L (ref 12–78)
ANION GAP SERPL CALC-SCNC: 4 MMOL/L (ref 5–15)
AST SERPL-CCNC: 14 U/L (ref 15–37)
BILIRUB SERPL-MCNC: 0.6 MG/DL (ref 0.2–1)
BUN SERPL-MCNC: 15 MG/DL (ref 6–20)
BUN/CREAT SERPL: 16 (ref 12–20)
CALCIUM SERPL-MCNC: 9.5 MG/DL (ref 8.5–10.1)
CHLORIDE SERPL-SCNC: 104 MMOL/L (ref 97–108)
CHOLEST SERPL-MCNC: 199 MG/DL
CO2 SERPL-SCNC: 27 MMOL/L (ref 21–32)
CREAT SERPL-MCNC: 0.91 MG/DL (ref 0.55–1.02)
ERYTHROCYTE [DISTWIDTH] IN BLOOD BY AUTOMATED COUNT: 12.2 % (ref 11.5–14.5)
GLOBULIN SER CALC-MCNC: 3.7 G/DL (ref 2–4)
GLUCOSE SERPL-MCNC: 102 MG/DL (ref 65–100)
HCT VFR BLD AUTO: 42.2 % (ref 35–47)
HDLC SERPL-MCNC: 43 MG/DL
HDLC SERPL: 4.6 (ref 0–5)
HGB BLD-MCNC: 14.1 G/DL (ref 11.5–16)
LDLC SERPL CALC-MCNC: 128.4 MG/DL (ref 0–100)
MCH RBC QN AUTO: 30.3 PG (ref 26–34)
MCHC RBC AUTO-ENTMCNC: 33.4 G/DL (ref 30–36.5)
MCV RBC AUTO: 90.6 FL (ref 80–99)
NRBC # BLD: 0 K/UL (ref 0–0.01)
NRBC BLD-RTO: 0 PER 100 WBC
PLATELET # BLD AUTO: 255 K/UL (ref 150–400)
PMV BLD AUTO: 9.9 FL (ref 8.9–12.9)
POTASSIUM SERPL-SCNC: 4 MMOL/L (ref 3.5–5.1)
PROT SERPL-MCNC: 7.6 G/DL (ref 6.4–8.2)
RBC # BLD AUTO: 4.66 M/UL (ref 3.8–5.2)
SODIUM SERPL-SCNC: 135 MMOL/L (ref 136–145)
TRIGL SERPL-MCNC: 138 MG/DL
TSH SERPL DL<=0.05 MIU/L-ACNC: 1.03 UIU/ML (ref 0.36–3.74)
VLDLC SERPL CALC-MCNC: 27.6 MG/DL
WBC # BLD AUTO: 4.7 K/UL (ref 3.6–11)

## 2023-08-29 PROCEDURE — 99396 PREV VISIT EST AGE 40-64: CPT | Performed by: INTERNAL MEDICINE

## 2023-08-29 PROCEDURE — 3078F DIAST BP <80 MM HG: CPT | Performed by: INTERNAL MEDICINE

## 2023-08-29 PROCEDURE — 3075F SYST BP GE 130 - 139MM HG: CPT | Performed by: INTERNAL MEDICINE

## 2023-08-29 RX ORDER — METOPROLOL SUCCINATE 50 MG/1
50 TABLET, EXTENDED RELEASE ORAL DAILY
Qty: 90 TABLET | Refills: 1 | Status: SHIPPED | OUTPATIENT
Start: 2023-08-29

## 2023-08-29 RX ORDER — LISINOPRIL 40 MG/1
40 TABLET ORAL DAILY
COMMUNITY
Start: 2020-03-17 | End: 2023-08-29 | Stop reason: SDUPTHER

## 2023-08-29 RX ORDER — AMLODIPINE BESYLATE 5 MG/1
5 TABLET ORAL DAILY
COMMUNITY
End: 2023-08-29 | Stop reason: SDUPTHER

## 2023-08-29 RX ORDER — AMLODIPINE BESYLATE 5 MG/1
5 TABLET ORAL DAILY
Qty: 90 TABLET | Refills: 1 | Status: SHIPPED | OUTPATIENT
Start: 2023-08-29

## 2023-08-29 RX ORDER — LISINOPRIL 40 MG/1
40 TABLET ORAL DAILY
Qty: 90 TABLET | Refills: 1 | Status: SHIPPED | OUTPATIENT
Start: 2023-08-29

## 2023-08-29 ASSESSMENT — PATIENT HEALTH QUESTIONNAIRE - PHQ9
SUM OF ALL RESPONSES TO PHQ QUESTIONS 1-9: 0
SUM OF ALL RESPONSES TO PHQ QUESTIONS 1-9: 0
1. LITTLE INTEREST OR PLEASURE IN DOING THINGS: 0
2. FEELING DOWN, DEPRESSED OR HOPELESS: 0
SUM OF ALL RESPONSES TO PHQ9 QUESTIONS 1 & 2: 0
SUM OF ALL RESPONSES TO PHQ QUESTIONS 1-9: 0
SUM OF ALL RESPONSES TO PHQ QUESTIONS 1-9: 0

## 2023-08-29 NOTE — PROGRESS NOTES
1. \"Have you been to the ER, urgent care clinic since your last visit? Hospitalized since your last visit? \" No    2. \"Have you seen or consulted any other health care providers outside of the [address] [place] [address] since your last visit? \" No     3. For patients aged 43-73: Has the patient had a colonoscopy / FIT/ Cologuard? Yes - Care Gap present. Most recent result on file      If the patient is female:    4. For patients aged 43-66: Has the patient had a mammogram within the past 2 years? Yes - Care Gap present. Most recent result on file      5. For patients aged 21-65: Has the patient had a pap smear? Yes - Care Gap present.  Most recent result on file
scribe. The services provided are my own. The documentation is accurate.

## 2023-08-30 LAB
EST. AVERAGE GLUCOSE BLD GHB EST-MCNC: 97 MG/DL
HBA1C MFR BLD: 5 % (ref 4–5.6)

## 2024-02-15 RX ORDER — AMLODIPINE BESYLATE 5 MG/1
5 TABLET ORAL DAILY
Qty: 90 TABLET | Refills: 0 | Status: SHIPPED | OUTPATIENT
Start: 2024-02-15

## 2024-02-15 RX ORDER — METOPROLOL SUCCINATE 50 MG/1
50 TABLET, EXTENDED RELEASE ORAL DAILY
Qty: 90 TABLET | Refills: 0 | Status: SHIPPED | OUTPATIENT
Start: 2024-02-15

## 2024-02-15 RX ORDER — LISINOPRIL 40 MG/1
40 TABLET ORAL DAILY
Qty: 90 TABLET | Refills: 0 | Status: SHIPPED | OUTPATIENT
Start: 2024-02-15

## 2024-02-15 NOTE — TELEPHONE ENCOUNTER
PCP: Denia Elizondo MD    Last appt: 8/29/2023  Future Appointments   Date Time Provider Department Center   3/5/2024 10:15 AM Denia Elizondo MD [facility]       Requested Prescriptions     Pending Prescriptions Disp Refills    lisinopril (PRINIVIL;ZESTRIL) 40 MG tablet 90 tablet 1     Sig: Take 1 tablet by mouth daily

## 2024-03-05 ENCOUNTER — OFFICE VISIT (OUTPATIENT)
Age: 64
End: 2024-03-05
Payer: OTHER GOVERNMENT

## 2024-03-05 VITALS
WEIGHT: 181 LBS | SYSTOLIC BLOOD PRESSURE: 127 MMHG | HEART RATE: 79 BPM | DIASTOLIC BLOOD PRESSURE: 87 MMHG | HEIGHT: 68 IN | OXYGEN SATURATION: 99 % | RESPIRATION RATE: 20 BRPM | BODY MASS INDEX: 27.43 KG/M2 | TEMPERATURE: 98.3 F

## 2024-03-05 DIAGNOSIS — E78.2 MIXED HYPERLIPIDEMIA: ICD-10-CM

## 2024-03-05 DIAGNOSIS — E66.3 OVERWEIGHT: ICD-10-CM

## 2024-03-05 DIAGNOSIS — I10 ESSENTIAL HYPERTENSION: Primary | ICD-10-CM

## 2024-03-05 PROCEDURE — 99213 OFFICE O/P EST LOW 20 MIN: CPT | Performed by: INTERNAL MEDICINE

## 2024-03-05 PROCEDURE — 3079F DIAST BP 80-89 MM HG: CPT | Performed by: INTERNAL MEDICINE

## 2024-03-05 PROCEDURE — 3074F SYST BP LT 130 MM HG: CPT | Performed by: INTERNAL MEDICINE

## 2024-03-05 RX ORDER — METOPROLOL SUCCINATE 50 MG/1
50 TABLET, EXTENDED RELEASE ORAL DAILY
Qty: 90 TABLET | Refills: 1 | Status: SHIPPED | OUTPATIENT
Start: 2024-03-05

## 2024-03-05 RX ORDER — AMLODIPINE BESYLATE 5 MG/1
5 TABLET ORAL DAILY
Qty: 90 TABLET | Refills: 1 | Status: SHIPPED | OUTPATIENT
Start: 2024-03-05

## 2024-03-05 RX ORDER — LISINOPRIL 40 MG/1
40 TABLET ORAL DAILY
Qty: 90 TABLET | Refills: 1 | Status: SHIPPED | OUTPATIENT
Start: 2024-03-05

## 2024-03-05 ASSESSMENT — PATIENT HEALTH QUESTIONNAIRE - PHQ9
SUM OF ALL RESPONSES TO PHQ QUESTIONS 1-9: 0
SUM OF ALL RESPONSES TO PHQ QUESTIONS 1-9: 0
1. LITTLE INTEREST OR PLEASURE IN DOING THINGS: 0
2. FEELING DOWN, DEPRESSED OR HOPELESS: 0
SUM OF ALL RESPONSES TO PHQ QUESTIONS 1-9: 0
SUM OF ALL RESPONSES TO PHQ QUESTIONS 1-9: 0

## 2024-03-05 NOTE — PROGRESS NOTES
\"Have you been to the ER, urgent care clinic since your last visit?  Hospitalized since your last visit?\"    NO    “Have you seen or consulted any other health care providers outside of [facility] since your last visit?”    NO        “Have you had a pap smear?”    YES - Where: Dr. Ganga [facility] 12/06/2023 and mammogram Nurse/CMA to request most recent records if not in the chart        
by mouth daily 90 tablet 0    Cholecalciferol 50 MCG (2000 UT) CAPS Take 1 capsule by mouth daily      docusate (COLACE, DULCOLAX) 100 MG CAPS Take 100 mg by mouth daily       No current facility-administered medications for this visit.     Past Surgical History:   Procedure Laterality Date    KNEE ARTHROSCOPY Left 10/2011    OTHER SURGICAL HISTORY Bilateral [id number]    Biliteral Cheilectomy on big toes    OTHER SURGICAL HISTORY Left 1986    Left ankle Arthroscopy         Lab Results   Component Value Date    WBC 4.7 08/29/2023    HGB 14.1 08/29/2023    HCT 42.2 08/29/2023    MCV 90.6 08/29/2023     08/29/2023     Lab Results   Component Value Date    CHOL 199 08/29/2023    TRIG 138 08/29/2023    HDL 43 08/29/2023    LDLCALC 128.4 (H) 08/29/2023     Lab Results   Component Value Date    CREATININE 0.91 08/29/2023    BUN 15 08/29/2023     (L) 08/29/2023    K 4.0 08/29/2023     08/29/2023    CO2 27 08/29/2023       Review of Systems   Respiratory:  Negative for shortness of breath.    Cardiovascular:  Negative for chest pain.         Physical Exam  Constitutional:       General: She is not in acute distress.     Appearance: She is not ill-appearing, toxic-appearing or diaphoretic.   HENT:      Head: Normocephalic and atraumatic.   Eyes:      General:         Right eye: No discharge.         Left eye: No discharge.      Extraocular Movements: Extraocular movements intact.   Neck:      Vascular: No carotid bruit.   Cardiovascular:      Rate and Rhythm: Normal rate and regular rhythm.      Heart sounds: Normal heart sounds. No murmur heard.  Pulmonary:      Effort: Pulmonary effort is normal. No respiratory distress.      Breath sounds: Normal breath sounds. No wheezing.   Musculoskeletal:         General: No swelling, tenderness or deformity.      Cervical back: Normal range of motion and neck supple. No tenderness.      Right lower leg: No edema.      Left lower leg: No edema.   Lymphadenopathy:

## 2024-03-06 LAB
ANION GAP SERPL CALC-SCNC: 5 MMOL/L (ref 5–15)
BUN SERPL-MCNC: 10 MG/DL (ref 6–20)
BUN/CREAT SERPL: 11 (ref 12–20)
CALCIUM SERPL-MCNC: 9.1 MG/DL (ref 8.5–10.1)
CHLORIDE SERPL-SCNC: 105 MMOL/L (ref 97–108)
CO2 SERPL-SCNC: 28 MMOL/L (ref 21–32)
CREAT SERPL-MCNC: 0.95 MG/DL (ref 0.55–1.02)
GLUCOSE SERPL-MCNC: 99 MG/DL (ref 65–100)
POTASSIUM SERPL-SCNC: 4.2 MMOL/L (ref 3.5–5.1)
SODIUM SERPL-SCNC: 138 MMOL/L (ref 136–145)

## 2024-09-07 SDOH — ECONOMIC STABILITY: FOOD INSECURITY: WITHIN THE PAST 12 MONTHS, THE FOOD YOU BOUGHT JUST DIDN'T LAST AND YOU DIDN'T HAVE MONEY TO GET MORE.: NEVER TRUE

## 2024-09-07 SDOH — ECONOMIC STABILITY: INCOME INSECURITY: HOW HARD IS IT FOR YOU TO PAY FOR THE VERY BASICS LIKE FOOD, HOUSING, MEDICAL CARE, AND HEATING?: NOT HARD AT ALL

## 2024-09-07 SDOH — ECONOMIC STABILITY: FOOD INSECURITY: WITHIN THE PAST 12 MONTHS, YOU WORRIED THAT YOUR FOOD WOULD RUN OUT BEFORE YOU GOT MONEY TO BUY MORE.: NEVER TRUE

## 2024-09-10 ENCOUNTER — OFFICE VISIT (OUTPATIENT)
Age: 64
End: 2024-09-10
Payer: OTHER GOVERNMENT

## 2024-09-10 VITALS
HEIGHT: 68 IN | TEMPERATURE: 97.8 F | SYSTOLIC BLOOD PRESSURE: 129 MMHG | WEIGHT: 180 LBS | BODY MASS INDEX: 27.28 KG/M2 | RESPIRATION RATE: 18 BRPM | DIASTOLIC BLOOD PRESSURE: 78 MMHG | HEART RATE: 86 BPM | OXYGEN SATURATION: 100 %

## 2024-09-10 DIAGNOSIS — E78.2 MIXED HYPERLIPIDEMIA: ICD-10-CM

## 2024-09-10 DIAGNOSIS — Z00.00 PHYSICAL EXAM: ICD-10-CM

## 2024-09-10 DIAGNOSIS — Z00.00 PHYSICAL EXAM: Primary | ICD-10-CM

## 2024-09-10 DIAGNOSIS — I10 ESSENTIAL HYPERTENSION: ICD-10-CM

## 2024-09-10 PROCEDURE — 3074F SYST BP LT 130 MM HG: CPT | Performed by: INTERNAL MEDICINE

## 2024-09-10 PROCEDURE — 3078F DIAST BP <80 MM HG: CPT | Performed by: INTERNAL MEDICINE

## 2024-09-10 PROCEDURE — 99396 PREV VISIT EST AGE 40-64: CPT | Performed by: INTERNAL MEDICINE

## 2024-09-10 RX ORDER — AMLODIPINE BESYLATE 5 MG/1
5 TABLET ORAL DAILY
Qty: 90 TABLET | Refills: 1 | Status: SHIPPED | OUTPATIENT
Start: 2024-09-10

## 2024-09-10 RX ORDER — METOPROLOL SUCCINATE 50 MG/1
50 TABLET, EXTENDED RELEASE ORAL DAILY
Qty: 90 TABLET | Refills: 1 | Status: SHIPPED | OUTPATIENT
Start: 2024-09-10

## 2024-09-10 RX ORDER — LISINOPRIL 40 MG/1
40 TABLET ORAL DAILY
Qty: 90 TABLET | Refills: 1 | Status: SHIPPED | OUTPATIENT
Start: 2024-09-10

## 2024-09-10 ASSESSMENT — PATIENT HEALTH QUESTIONNAIRE - PHQ9
SUM OF ALL RESPONSES TO PHQ QUESTIONS 1-9: 0
1. LITTLE INTEREST OR PLEASURE IN DOING THINGS: NOT AT ALL
SUM OF ALL RESPONSES TO PHQ QUESTIONS 1-9: 0
SUM OF ALL RESPONSES TO PHQ9 QUESTIONS 1 & 2: 0
2. FEELING DOWN, DEPRESSED OR HOPELESS: NOT AT ALL

## 2024-09-11 LAB
ALBUMIN SERPL-MCNC: 3.8 G/DL (ref 3.5–5)
ALBUMIN/GLOB SERPL: 1 (ref 1.1–2.2)
ALP SERPL-CCNC: 120 U/L (ref 45–117)
ALT SERPL-CCNC: 15 U/L (ref 12–78)
ANION GAP SERPL CALC-SCNC: 5 MMOL/L (ref 2–12)
AST SERPL-CCNC: 11 U/L (ref 15–37)
BILIRUB SERPL-MCNC: 0.6 MG/DL (ref 0.2–1)
BUN SERPL-MCNC: 17 MG/DL (ref 6–20)
BUN/CREAT SERPL: 18 (ref 12–20)
CALCIUM SERPL-MCNC: 9.8 MG/DL (ref 8.5–10.1)
CHLORIDE SERPL-SCNC: 103 MMOL/L (ref 97–108)
CHOLEST SERPL-MCNC: 200 MG/DL
CO2 SERPL-SCNC: 30 MMOL/L (ref 21–32)
CREAT SERPL-MCNC: 0.94 MG/DL (ref 0.55–1.02)
ERYTHROCYTE [DISTWIDTH] IN BLOOD BY AUTOMATED COUNT: 12.2 % (ref 11.5–14.5)
EST. AVERAGE GLUCOSE BLD GHB EST-MCNC: 94 MG/DL
GLOBULIN SER CALC-MCNC: 3.8 G/DL (ref 2–4)
GLUCOSE SERPL-MCNC: 102 MG/DL (ref 65–100)
HBA1C MFR BLD: 4.9 % (ref 4–5.6)
HCT VFR BLD AUTO: 40.6 % (ref 35–47)
HDLC SERPL-MCNC: 53 MG/DL
HDLC SERPL: 3.8 (ref 0–5)
HGB BLD-MCNC: 13.9 G/DL (ref 11.5–16)
LDLC SERPL CALC-MCNC: 120 MG/DL (ref 0–100)
MCH RBC QN AUTO: 30.2 PG (ref 26–34)
MCHC RBC AUTO-ENTMCNC: 34.2 G/DL (ref 30–36.5)
MCV RBC AUTO: 88.3 FL (ref 80–99)
NRBC # BLD: 0 K/UL (ref 0–0.01)
NRBC BLD-RTO: 0 PER 100 WBC
PLATELET # BLD AUTO: 283 K/UL (ref 150–400)
PMV BLD AUTO: 10.3 FL (ref 8.9–12.9)
POTASSIUM SERPL-SCNC: 4.3 MMOL/L (ref 3.5–5.1)
PROT SERPL-MCNC: 7.6 G/DL (ref 6.4–8.2)
RBC # BLD AUTO: 4.6 M/UL (ref 3.8–5.2)
SODIUM SERPL-SCNC: 138 MMOL/L (ref 136–145)
TRIGL SERPL-MCNC: 135 MG/DL
TSH SERPL DL<=0.05 MIU/L-ACNC: 1.23 UIU/ML (ref 0.36–3.74)
VLDLC SERPL CALC-MCNC: 27 MG/DL
WBC # BLD AUTO: 5 K/UL (ref 3.6–11)

## 2025-03-08 SDOH — ECONOMIC STABILITY: FOOD INSECURITY: WITHIN THE PAST 12 MONTHS, YOU WORRIED THAT YOUR FOOD WOULD RUN OUT BEFORE YOU GOT MONEY TO BUY MORE.: NEVER TRUE

## 2025-03-08 SDOH — ECONOMIC STABILITY: INCOME INSECURITY: IN THE LAST 12 MONTHS, WAS THERE A TIME WHEN YOU WERE NOT ABLE TO PAY THE MORTGAGE OR RENT ON TIME?: NO

## 2025-03-08 SDOH — ECONOMIC STABILITY: FOOD INSECURITY: WITHIN THE PAST 12 MONTHS, THE FOOD YOU BOUGHT JUST DIDN'T LAST AND YOU DIDN'T HAVE MONEY TO GET MORE.: NEVER TRUE

## 2025-03-08 SDOH — ECONOMIC STABILITY: TRANSPORTATION INSECURITY
IN THE PAST 12 MONTHS, HAS THE LACK OF TRANSPORTATION KEPT YOU FROM MEDICAL APPOINTMENTS OR FROM GETTING MEDICATIONS?: NO

## 2025-03-11 ENCOUNTER — RESULTS FOLLOW-UP (OUTPATIENT)
Age: 65
End: 2025-03-11

## 2025-03-11 ENCOUNTER — OFFICE VISIT (OUTPATIENT)
Age: 65
End: 2025-03-11
Payer: OTHER GOVERNMENT

## 2025-03-11 VITALS
DIASTOLIC BLOOD PRESSURE: 85 MMHG | SYSTOLIC BLOOD PRESSURE: 138 MMHG | TEMPERATURE: 97.2 F | BODY MASS INDEX: 28.04 KG/M2 | HEIGHT: 68 IN | OXYGEN SATURATION: 100 % | HEART RATE: 90 BPM | WEIGHT: 185 LBS

## 2025-03-11 DIAGNOSIS — E55.9 VITAMIN D DEFICIENCY: ICD-10-CM

## 2025-03-11 DIAGNOSIS — I10 ESSENTIAL HYPERTENSION: Primary | ICD-10-CM

## 2025-03-11 DIAGNOSIS — E78.2 MIXED HYPERLIPIDEMIA: ICD-10-CM

## 2025-03-11 DIAGNOSIS — R73.01 IFG (IMPAIRED FASTING GLUCOSE): ICD-10-CM

## 2025-03-11 DIAGNOSIS — Z23 NEED FOR PROPHYLACTIC VACCINATION AGAINST STREPTOCOCCUS PNEUMONIAE (PNEUMOCOCCUS): ICD-10-CM

## 2025-03-11 DIAGNOSIS — Z23 NEED FOR PROPHYLACTIC VACCINATION AGAINST DIPHTHERIA-TETANUS-PERTUSSIS (DTP): ICD-10-CM

## 2025-03-11 LAB
ANION GAP SERPL CALC-SCNC: 5 MMOL/L (ref 2–12)
BUN SERPL-MCNC: 12 MG/DL (ref 6–20)
BUN/CREAT SERPL: 14 (ref 12–20)
CALCIUM SERPL-MCNC: 9.5 MG/DL (ref 8.5–10.1)
CHLORIDE SERPL-SCNC: 104 MMOL/L (ref 97–108)
CO2 SERPL-SCNC: 26 MMOL/L (ref 21–32)
CREAT SERPL-MCNC: 0.86 MG/DL (ref 0.55–1.02)
GLUCOSE SERPL-MCNC: 107 MG/DL (ref 65–100)
POTASSIUM SERPL-SCNC: 4.1 MMOL/L (ref 3.5–5.1)
SODIUM SERPL-SCNC: 135 MMOL/L (ref 136–145)

## 2025-03-11 PROCEDURE — 99214 OFFICE O/P EST MOD 30 MIN: CPT | Performed by: INTERNAL MEDICINE

## 2025-03-11 PROCEDURE — 3075F SYST BP GE 130 - 139MM HG: CPT | Performed by: INTERNAL MEDICINE

## 2025-03-11 PROCEDURE — 3079F DIAST BP 80-89 MM HG: CPT | Performed by: INTERNAL MEDICINE

## 2025-03-11 PROCEDURE — 90715 TDAP VACCINE 7 YRS/> IM: CPT | Performed by: INTERNAL MEDICINE

## 2025-03-11 PROCEDURE — PBSHW TDAP, BOOSTRIX, (AGE 10 YRS+), IM: Performed by: INTERNAL MEDICINE

## 2025-03-11 PROCEDURE — G0009 ADMIN PNEUMOCOCCAL VACCINE: HCPCS | Performed by: INTERNAL MEDICINE

## 2025-03-11 PROCEDURE — PBSHW PNEUMOCOCCAL, PCV20, PREVNAR 20, (AGE 6W+), IM, PF: Performed by: INTERNAL MEDICINE

## 2025-03-11 RX ORDER — AMLODIPINE BESYLATE 5 MG/1
5 TABLET ORAL DAILY
Qty: 90 TABLET | Refills: 1 | Status: SHIPPED | OUTPATIENT
Start: 2025-03-11

## 2025-03-11 RX ORDER — LISINOPRIL 40 MG/1
40 TABLET ORAL DAILY
Qty: 90 TABLET | Refills: 1 | Status: SHIPPED | OUTPATIENT
Start: 2025-03-11

## 2025-03-11 RX ORDER — METOPROLOL SUCCINATE 50 MG/1
50 TABLET, EXTENDED RELEASE ORAL DAILY
Qty: 90 TABLET | Refills: 1 | Status: SHIPPED | OUTPATIENT
Start: 2025-03-11

## 2025-03-11 ASSESSMENT — PATIENT HEALTH QUESTIONNAIRE - PHQ9
SUM OF ALL RESPONSES TO PHQ QUESTIONS 1-9: 0
2. FEELING DOWN, DEPRESSED OR HOPELESS: NOT AT ALL
SUM OF ALL RESPONSES TO PHQ QUESTIONS 1-9: 0
1. LITTLE INTEREST OR PLEASURE IN DOING THINGS: NOT AT ALL

## 2025-03-11 NOTE — PROGRESS NOTES
HISTORY OF PRESENT ILLNESS  Abigail Lyles is a 64 y.o. female.  HPI  Last here 9/10/24. Here for routine care.     She is going on a 2 week cruise to [place] with her granddaughter who is a doron in high school      Has a history of hypertension  BP today is 141/82 will repeat 138/85  BP at home: 126/82 116/76 137/82 129/78 120/83 131/76 134/79 136/72 132/76 126/80 131/74 126/69  She is on lisinopril 40 mg, toprol xl 50 mg, and norvasc 5mg, took these today   Stopped chlorthalidone 25 mg lov due to hyponatremia     Pt has previously noted very minor swelling in ankles after starting norvasc, this does not bother her too much     Wt today is 185 lbs, up 5 lbs since lov   Discussed diet and wt/l, she is just above the nl range      Reviewed labs   Ordered labs      Previously discussed CT Ca heart score for now holding off     Her previous doctor thought she had a heart attack & SSS   She had cardiac workup 6-7 years ago, stress echo, holter monitor - all nl per pt   12/14/15 nl stress echo     She takes fish oil and vit d3 2000U    She will be on medicare  for life next appointment      Of note, her  was recently dx'd w/ prostate cancer.        PREVENTIVE:  Colonoscopy:  Dr. Fitzgerald [place], [place] # [phone] 10-year repeat  Dexa: unofficial one years ago, not yet needed, she declines medication, will not order dexa   Mammo: [facility]  normal mychart  Pap: C Dr. Schuler   Tdap: will do 3/11/25     [id number]: will do 3/11/25     Shingrix: completed both   Flu shot:   RSV: discussed   Eye exam: , scheduled 3/25 this month   Lipids: LDL  120  A1c: 10/21 5.4  5.2  5.0  4.9   Hep C:  negative  Covid: 6 doses (pfizer), including bivalent, most recent    EK21    Patient Active Problem List   Diagnosis    Vitamin D deficiency    Mixed hyperlipidemia    Essential hypertension     Current Outpatient Medications   Medication Sig Dispense 
\"Have you been to the ER, urgent care clinic since your last visit?  Hospitalized since your last visit?\"    NO    “Have you seen or consulted any other health care providers outside our system since your last visit?”    NO           
No bruits; no thyromegaly or nodules